# Patient Record
Sex: MALE | Employment: OTHER | URBAN - METROPOLITAN AREA
[De-identification: names, ages, dates, MRNs, and addresses within clinical notes are randomized per-mention and may not be internally consistent; named-entity substitution may affect disease eponyms.]

---

## 2022-12-03 ENCOUNTER — APPOINTMENT (OUTPATIENT)
Dept: CT IMAGING | Age: 82
DRG: 871 | End: 2022-12-03
Payer: MEDICARE

## 2022-12-03 ENCOUNTER — HOSPITAL ENCOUNTER (INPATIENT)
Age: 82
LOS: 1 days | Discharge: HOSPICE | DRG: 871 | End: 2022-12-05
Attending: EMERGENCY MEDICINE | Admitting: INTERNAL MEDICINE
Payer: MEDICARE

## 2022-12-03 ENCOUNTER — APPOINTMENT (OUTPATIENT)
Dept: GENERAL RADIOLOGY | Age: 82
DRG: 871 | End: 2022-12-03
Payer: MEDICARE

## 2022-12-03 DIAGNOSIS — R65.20 SEVERE SEPSIS (HCC): Primary | ICD-10-CM

## 2022-12-03 DIAGNOSIS — N17.9 ACUTE RENAL FAILURE, UNSPECIFIED ACUTE RENAL FAILURE TYPE (HCC): ICD-10-CM

## 2022-12-03 DIAGNOSIS — J18.9 PNEUMONIA OF RIGHT UPPER LOBE DUE TO INFECTIOUS ORGANISM: ICD-10-CM

## 2022-12-03 DIAGNOSIS — Z71.89 GOALS OF CARE, COUNSELING/DISCUSSION: ICD-10-CM

## 2022-12-03 DIAGNOSIS — A41.9 SEVERE SEPSIS (HCC): Primary | ICD-10-CM

## 2022-12-03 LAB
A/G RATIO: 1.2 (ref 1.1–2.2)
ALBUMIN SERPL-MCNC: 3.9 G/DL (ref 3.4–5)
ALP BLD-CCNC: 105 U/L (ref 40–129)
ALT SERPL-CCNC: 18 U/L (ref 10–40)
ANION GAP SERPL CALCULATED.3IONS-SCNC: 34 MMOL/L (ref 3–16)
AST SERPL-CCNC: 15 U/L (ref 15–37)
BASOPHILS ABSOLUTE: 0 K/UL (ref 0–0.2)
BASOPHILS RELATIVE PERCENT: 0.1 %
BILIRUB SERPL-MCNC: 0.4 MG/DL (ref 0–1)
BUN BLDV-MCNC: 186 MG/DL (ref 7–20)
CALCIUM SERPL-MCNC: 10.8 MG/DL (ref 8.3–10.6)
CHLORIDE BLD-SCNC: 107 MMOL/L (ref 99–110)
CO2: 11 MMOL/L (ref 21–32)
CREAT SERPL-MCNC: 10.5 MG/DL (ref 0.8–1.3)
EOSINOPHILS ABSOLUTE: 0 K/UL (ref 0–0.6)
EOSINOPHILS RELATIVE PERCENT: 0 %
GFR SERPL CREATININE-BSD FRML MDRD: 4 ML/MIN/{1.73_M2}
GLUCOSE BLD-MCNC: 151 MG/DL (ref 70–99)
HCT VFR BLD CALC: 54.4 % (ref 40.5–52.5)
HEMOGLOBIN: 17.6 G/DL (ref 13.5–17.5)
LACTIC ACID, SEPSIS: 6 MMOL/L (ref 0.4–1.9)
LIPASE: 18 U/L (ref 13–60)
LYMPHOCYTES ABSOLUTE: 1.5 K/UL (ref 1–5.1)
LYMPHOCYTES RELATIVE PERCENT: 6.9 %
MCH RBC QN AUTO: 26.7 PG (ref 26–34)
MCHC RBC AUTO-ENTMCNC: 32.3 G/DL (ref 31–36)
MCV RBC AUTO: 82.6 FL (ref 80–100)
MONOCYTES ABSOLUTE: 1.9 K/UL (ref 0–1.3)
MONOCYTES RELATIVE PERCENT: 8.3 %
NEUTROPHILS ABSOLUTE: 18.9 K/UL (ref 1.7–7.7)
NEUTROPHILS RELATIVE PERCENT: 84.7 %
PDW BLD-RTO: 16.8 % (ref 12.4–15.4)
PLATELET # BLD: 245 K/UL (ref 135–450)
PMV BLD AUTO: 8.6 FL (ref 5–10.5)
POTASSIUM SERPL-SCNC: 5.4 MMOL/L (ref 3.5–5.1)
RAPID INFLUENZA  B AGN: NEGATIVE
RAPID INFLUENZA A AGN: NEGATIVE
RBC # BLD: 6.59 M/UL (ref 4.2–5.9)
SARS-COV-2, NAAT: NOT DETECTED
SODIUM BLD-SCNC: 152 MMOL/L (ref 136–145)
TOTAL PROTEIN: 7.1 G/DL (ref 6.4–8.2)
TROPONIN: 0.14 NG/ML
WBC # BLD: 22.3 K/UL (ref 4–11)

## 2022-12-03 PROCEDURE — 87635 SARS-COV-2 COVID-19 AMP PRB: CPT

## 2022-12-03 PROCEDURE — 96367 TX/PROPH/DG ADDL SEQ IV INF: CPT

## 2022-12-03 PROCEDURE — 80053 COMPREHEN METABOLIC PANEL: CPT

## 2022-12-03 PROCEDURE — 99285 EMERGENCY DEPT VISIT HI MDM: CPT

## 2022-12-03 PROCEDURE — 87804 INFLUENZA ASSAY W/OPTIC: CPT

## 2022-12-03 PROCEDURE — 71045 X-RAY EXAM CHEST 1 VIEW: CPT

## 2022-12-03 PROCEDURE — 83690 ASSAY OF LIPASE: CPT

## 2022-12-03 PROCEDURE — 74176 CT ABD & PELVIS W/O CONTRAST: CPT

## 2022-12-03 PROCEDURE — 84484 ASSAY OF TROPONIN QUANT: CPT

## 2022-12-03 PROCEDURE — 36415 COLL VENOUS BLD VENIPUNCTURE: CPT

## 2022-12-03 PROCEDURE — 96365 THER/PROPH/DIAG IV INF INIT: CPT

## 2022-12-03 PROCEDURE — 85025 COMPLETE CBC W/AUTO DIFF WBC: CPT

## 2022-12-03 PROCEDURE — 83605 ASSAY OF LACTIC ACID: CPT

## 2022-12-03 PROCEDURE — 2580000003 HC RX 258: Performed by: NURSE PRACTITIONER

## 2022-12-03 RX ORDER — ACETAMINOPHEN 325 MG/1
650 TABLET ORAL EVERY 4 HOURS PRN
Status: ON HOLD | COMMUNITY
End: 2022-12-05 | Stop reason: HOSPADM

## 2022-12-03 RX ORDER — 0.9 % SODIUM CHLORIDE 0.9 %
1000 INTRAVENOUS SOLUTION INTRAVENOUS ONCE
Status: COMPLETED | OUTPATIENT
Start: 2022-12-03 | End: 2022-12-04

## 2022-12-03 RX ADMIN — SODIUM CHLORIDE 1000 ML: 9 INJECTION, SOLUTION INTRAVENOUS at 23:06

## 2022-12-03 ASSESSMENT — ENCOUNTER SYMPTOMS
ABDOMINAL PAIN: 0
NAUSEA: 0
VOMITING: 0
CHEST TIGHTNESS: 0
SHORTNESS OF BREATH: 0
DIARRHEA: 0

## 2022-12-04 PROBLEM — E11.9 DM2 (DIABETES MELLITUS, TYPE 2) (HCC): Status: ACTIVE | Noted: 2022-12-04

## 2022-12-04 PROBLEM — A41.9 SEPSIS (HCC): Status: ACTIVE | Noted: 2022-12-04

## 2022-12-04 PROBLEM — E87.0 HYPERNATREMIA: Status: ACTIVE | Noted: 2022-12-04
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
## 2022-12-04 PROBLEM — N17.9 ARF (ACUTE RENAL FAILURE) (HCC): Status: ACTIVE | Noted: 2022-12-04

## 2022-12-04 PROBLEM — J18.9 HOSPITAL-ACQUIRED PNEUMONIA: Status: ACTIVE | Noted: 2022-12-04

## 2022-12-04 LAB
ALBUMIN SERPL-MCNC: 2.8 G/DL (ref 3.4–5)
ALBUMIN SERPL-MCNC: 2.9 G/DL (ref 3.4–5)
ALBUMIN SERPL-MCNC: 3 G/DL (ref 3.4–5)
ANION GAP SERPL CALCULATED.3IONS-SCNC: 37 MMOL/L (ref 3–16)
ANION GAP SERPL CALCULATED.3IONS-SCNC: 37 MMOL/L (ref 3–16)
ANION GAP SERPL CALCULATED.3IONS-SCNC: 40 MMOL/L (ref 3–16)
BUN BLDV-MCNC: 198 MG/DL (ref 7–20)
BUN BLDV-MCNC: 202 MG/DL (ref 7–20)
BUN BLDV-MCNC: 205 MG/DL (ref 7–20)
CALCIUM SERPL-MCNC: 9.3 MG/DL (ref 8.3–10.6)
CALCIUM SERPL-MCNC: 9.6 MG/DL (ref 8.3–10.6)
CALCIUM SERPL-MCNC: 9.8 MG/DL (ref 8.3–10.6)
CHLORIDE BLD-SCNC: 107 MMOL/L (ref 99–110)
CHLORIDE URINE RANDOM: 22 MMOL/L
CO2: 4 MMOL/L (ref 21–32)
CO2: 4 MMOL/L (ref 21–32)
CO2: 7 MMOL/L (ref 21–32)
CREAT SERPL-MCNC: 10.7 MG/DL (ref 0.8–1.3)
CREAT SERPL-MCNC: 10.9 MG/DL (ref 0.8–1.3)
CREAT SERPL-MCNC: 11.5 MG/DL (ref 0.8–1.3)
CREATININE URINE: 104.3 MG/DL (ref 39–259)
GFR SERPL CREATININE-BSD FRML MDRD: 4 ML/MIN/{1.73_M2}
GLUCOSE BLD-MCNC: 183 MG/DL (ref 70–99)
GLUCOSE BLD-MCNC: 197 MG/DL (ref 70–99)
GLUCOSE BLD-MCNC: 208 MG/DL (ref 70–99)
GLUCOSE BLD-MCNC: 209 MG/DL (ref 70–99)
GLUCOSE BLD-MCNC: 215 MG/DL (ref 70–99)
GLUCOSE BLD-MCNC: 270 MG/DL (ref 70–99)
GLUCOSE BLD-MCNC: 273 MG/DL (ref 70–99)
GLUCOSE BLD-MCNC: 286 MG/DL (ref 70–99)
LACTIC ACID, SEPSIS: 7.7 MMOL/L (ref 0.4–1.9)
OSMOLALITY URINE: 379 MOSM/KG (ref 390–1070)
PERFORMED ON: ABNORMAL
PHOSPHORUS: 10.5 MG/DL (ref 2.5–4.9)
PHOSPHORUS: 11.6 MG/DL (ref 2.5–4.9)
PHOSPHORUS: 11.7 MG/DL (ref 2.5–4.9)
POTASSIUM SERPL-SCNC: 5.4 MMOL/L (ref 3.5–5.1)
POTASSIUM SERPL-SCNC: 5.7 MMOL/L (ref 3.5–5.1)
POTASSIUM SERPL-SCNC: 6.4 MMOL/L (ref 3.5–5.1)
POTASSIUM, UR: 43.1 MMOL/L
PROCALCITONIN: 1.44 NG/ML (ref 0–0.15)
REASON FOR REJECTION: NORMAL
REJECTED TEST: NORMAL
SODIUM BLD-SCNC: 148 MMOL/L (ref 136–145)
SODIUM BLD-SCNC: 151 MMOL/L (ref 136–145)
SODIUM BLD-SCNC: 151 MMOL/L (ref 136–145)
SODIUM URINE: <20 MMOL/L
UREA NITROGEN, UR: 428.7 MG/DL (ref 800–1666)
URIC ACID, SERUM: 14 MG/DL (ref 3.5–7.2)
URINE TYPE: NORMAL
VANCOMYCIN RANDOM: 10.4 UG/ML

## 2022-12-04 PROCEDURE — 6370000000 HC RX 637 (ALT 250 FOR IP): Performed by: INTERNAL MEDICINE

## 2022-12-04 PROCEDURE — 2500000003 HC RX 250 WO HCPCS: Performed by: INTERNAL MEDICINE

## 2022-12-04 PROCEDURE — 87449 NOS EACH ORGANISM AG IA: CPT

## 2022-12-04 PROCEDURE — 2580000003 HC RX 258: Performed by: INTERNAL MEDICINE

## 2022-12-04 PROCEDURE — 84300 ASSAY OF URINE SODIUM: CPT

## 2022-12-04 PROCEDURE — 82570 ASSAY OF URINE CREATININE: CPT

## 2022-12-04 PROCEDURE — 84145 PROCALCITONIN (PCT): CPT

## 2022-12-04 PROCEDURE — 82436 ASSAY OF URINE CHLORIDE: CPT

## 2022-12-04 PROCEDURE — 80202 ASSAY OF VANCOMYCIN: CPT

## 2022-12-04 PROCEDURE — 87205 SMEAR GRAM STAIN: CPT

## 2022-12-04 PROCEDURE — 83935 ASSAY OF URINE OSMOLALITY: CPT

## 2022-12-04 PROCEDURE — 94761 N-INVAS EAR/PLS OXIMETRY MLT: CPT

## 2022-12-04 PROCEDURE — 89220 SPUTUM SPECIMEN COLLECTION: CPT

## 2022-12-04 PROCEDURE — 36415 COLL VENOUS BLD VENIPUNCTURE: CPT

## 2022-12-04 PROCEDURE — 87040 BLOOD CULTURE FOR BACTERIA: CPT

## 2022-12-04 PROCEDURE — 84550 ASSAY OF BLOOD/URIC ACID: CPT

## 2022-12-04 PROCEDURE — 94640 AIRWAY INHALATION TREATMENT: CPT

## 2022-12-04 PROCEDURE — 1200000000 HC SEMI PRIVATE

## 2022-12-04 PROCEDURE — 2580000003 HC RX 258: Performed by: NURSE PRACTITIONER

## 2022-12-04 PROCEDURE — 83605 ASSAY OF LACTIC ACID: CPT

## 2022-12-04 PROCEDURE — 84540 ASSAY OF URINE/UREA-N: CPT

## 2022-12-04 PROCEDURE — 6360000002 HC RX W HCPCS: Performed by: NURSE PRACTITIONER

## 2022-12-04 PROCEDURE — 2700000000 HC OXYGEN THERAPY PER DAY

## 2022-12-04 PROCEDURE — 84133 ASSAY OF URINE POTASSIUM: CPT

## 2022-12-04 PROCEDURE — 94680 O2 UPTK RST&XERS DIR SIMPLE: CPT

## 2022-12-04 PROCEDURE — 80069 RENAL FUNCTION PANEL: CPT

## 2022-12-04 PROCEDURE — 6360000002 HC RX W HCPCS: Performed by: INTERNAL MEDICINE

## 2022-12-04 RX ORDER — INSULIN GLARGINE 100 [IU]/ML
46 INJECTION, SOLUTION SUBCUTANEOUS NIGHTLY
Status: ON HOLD | COMMUNITY
End: 2022-12-05 | Stop reason: HOSPADM

## 2022-12-04 RX ORDER — ROSUVASTATIN CALCIUM 40 MG/1
40 TABLET, COATED ORAL EVERY EVENING
Status: ON HOLD | COMMUNITY
End: 2022-12-05 | Stop reason: HOSPADM

## 2022-12-04 RX ORDER — INSULIN GLARGINE 100 [IU]/ML
46 INJECTION, SOLUTION SUBCUTANEOUS NIGHTLY
Status: DISCONTINUED | OUTPATIENT
Start: 2022-12-04 | End: 2022-12-05 | Stop reason: HOSPADM

## 2022-12-04 RX ORDER — TAMSULOSIN HYDROCHLORIDE 0.4 MG/1
0.4 CAPSULE ORAL DAILY
Status: ON HOLD | COMMUNITY
End: 2022-12-05 | Stop reason: HOSPADM

## 2022-12-04 RX ORDER — ENOXAPARIN SODIUM 100 MG/ML
40 INJECTION SUBCUTANEOUS DAILY
Status: DISCONTINUED | OUTPATIENT
Start: 2022-12-04 | End: 2022-12-04

## 2022-12-04 RX ORDER — IPRATROPIUM BROMIDE AND ALBUTEROL SULFATE 2.5; .5 MG/3ML; MG/3ML
1 SOLUTION RESPIRATORY (INHALATION) 4 TIMES DAILY
Status: DISCONTINUED | OUTPATIENT
Start: 2022-12-04 | End: 2022-12-05 | Stop reason: HOSPADM

## 2022-12-04 RX ORDER — DEXTROSE MONOHYDRATE 100 MG/ML
INJECTION, SOLUTION INTRAVENOUS CONTINUOUS PRN
Status: DISCONTINUED | OUTPATIENT
Start: 2022-12-04 | End: 2022-12-05 | Stop reason: HOSPADM

## 2022-12-04 RX ORDER — INSULIN LISPRO 100 [IU]/ML
1-10 INJECTION, SOLUTION INTRAVENOUS; SUBCUTANEOUS
Status: DISCONTINUED | OUTPATIENT
Start: 2022-12-04 | End: 2022-12-05 | Stop reason: HOSPADM

## 2022-12-04 RX ORDER — IPRATROPIUM BROMIDE AND ALBUTEROL SULFATE 2.5; .5 MG/3ML; MG/3ML
1 SOLUTION RESPIRATORY (INHALATION)
Status: DISCONTINUED | OUTPATIENT
Start: 2022-12-04 | End: 2022-12-04

## 2022-12-04 RX ORDER — ACETAMINOPHEN 325 MG/1
650 TABLET ORAL EVERY 4 HOURS PRN
Status: DISCONTINUED | OUTPATIENT
Start: 2022-12-04 | End: 2022-12-04 | Stop reason: SDUPTHER

## 2022-12-04 RX ORDER — 0.9 % SODIUM CHLORIDE 0.9 %
500 INTRAVENOUS SOLUTION INTRAVENOUS PRN
Status: DISCONTINUED | OUTPATIENT
Start: 2022-12-04 | End: 2022-12-05 | Stop reason: HOSPADM

## 2022-12-04 RX ORDER — POTASSIUM CHLORIDE 7.45 MG/ML
10 INJECTION INTRAVENOUS PRN
Status: DISCONTINUED | OUTPATIENT
Start: 2022-12-04 | End: 2022-12-04

## 2022-12-04 RX ORDER — 0.9 % SODIUM CHLORIDE 0.9 %
1000 INTRAVENOUS SOLUTION INTRAVENOUS ONCE
Status: COMPLETED | OUTPATIENT
Start: 2022-12-04 | End: 2022-12-04

## 2022-12-04 RX ORDER — SODIUM CHLORIDE 9 MG/ML
INJECTION, SOLUTION INTRAVENOUS CONTINUOUS
Status: DISCONTINUED | OUTPATIENT
Start: 2022-12-04 | End: 2022-12-04

## 2022-12-04 RX ORDER — ROSUVASTATIN CALCIUM 40 MG/1
40 TABLET, COATED ORAL EVERY EVENING
Status: DISCONTINUED | OUTPATIENT
Start: 2022-12-04 | End: 2022-12-05 | Stop reason: HOSPADM

## 2022-12-04 RX ORDER — SODIUM CHLORIDE 0.9 % (FLUSH) 0.9 %
5-40 SYRINGE (ML) INJECTION EVERY 12 HOURS SCHEDULED
Status: DISCONTINUED | OUTPATIENT
Start: 2022-12-04 | End: 2022-12-05 | Stop reason: HOSPADM

## 2022-12-04 RX ORDER — ONDANSETRON 4 MG/1
4 TABLET, ORALLY DISINTEGRATING ORAL EVERY 8 HOURS PRN
Status: DISCONTINUED | OUTPATIENT
Start: 2022-12-04 | End: 2022-12-05 | Stop reason: HOSPADM

## 2022-12-04 RX ORDER — ONDANSETRON 2 MG/ML
4 INJECTION INTRAMUSCULAR; INTRAVENOUS EVERY 6 HOURS PRN
Status: DISCONTINUED | OUTPATIENT
Start: 2022-12-04 | End: 2022-12-05 | Stop reason: HOSPADM

## 2022-12-04 RX ORDER — SODIUM CHLORIDE 9 MG/ML
INJECTION, SOLUTION INTRAVENOUS PRN
Status: DISCONTINUED | OUTPATIENT
Start: 2022-12-04 | End: 2022-12-05 | Stop reason: HOSPADM

## 2022-12-04 RX ORDER — ACETAMINOPHEN 650 MG/1
650 SUPPOSITORY RECTAL EVERY 6 HOURS PRN
Status: DISCONTINUED | OUTPATIENT
Start: 2022-12-04 | End: 2022-12-05 | Stop reason: HOSPADM

## 2022-12-04 RX ORDER — HEPARIN SODIUM 5000 [USP'U]/ML
5000 INJECTION, SOLUTION INTRAVENOUS; SUBCUTANEOUS EVERY 8 HOURS SCHEDULED
Status: DISCONTINUED | OUTPATIENT
Start: 2022-12-04 | End: 2022-12-05 | Stop reason: HOSPADM

## 2022-12-04 RX ORDER — POTASSIUM CHLORIDE 20 MEQ/1
40 TABLET, EXTENDED RELEASE ORAL PRN
Status: DISCONTINUED | OUTPATIENT
Start: 2022-12-04 | End: 2022-12-04

## 2022-12-04 RX ORDER — ACETAMINOPHEN 325 MG/1
650 TABLET ORAL EVERY 6 HOURS PRN
Status: DISCONTINUED | OUTPATIENT
Start: 2022-12-04 | End: 2022-12-05 | Stop reason: HOSPADM

## 2022-12-04 RX ORDER — HYDRALAZINE HYDROCHLORIDE 20 MG/ML
10 INJECTION INTRAMUSCULAR; INTRAVENOUS EVERY 6 HOURS PRN
Status: DISCONTINUED | OUTPATIENT
Start: 2022-12-04 | End: 2022-12-05 | Stop reason: HOSPADM

## 2022-12-04 RX ORDER — TAMSULOSIN HYDROCHLORIDE 0.4 MG/1
0.4 CAPSULE ORAL DAILY
Status: DISCONTINUED | OUTPATIENT
Start: 2022-12-04 | End: 2022-12-05 | Stop reason: HOSPADM

## 2022-12-04 RX ORDER — SODIUM CHLORIDE 0.9 % (FLUSH) 0.9 %
10 SYRINGE (ML) INJECTION PRN
Status: DISCONTINUED | OUTPATIENT
Start: 2022-12-04 | End: 2022-12-05 | Stop reason: HOSPADM

## 2022-12-04 RX ORDER — ALBUTEROL SULFATE 2.5 MG/3ML
2.5 SOLUTION RESPIRATORY (INHALATION) EVERY 4 HOURS PRN
Status: DISCONTINUED | OUTPATIENT
Start: 2022-12-04 | End: 2022-12-05 | Stop reason: HOSPADM

## 2022-12-04 RX ADMIN — INSULIN GLARGINE 46 UNITS: 100 INJECTION, SOLUTION SUBCUTANEOUS at 21:56

## 2022-12-04 RX ADMIN — INSULIN LISPRO 6 UNITS: 100 INJECTION, SOLUTION INTRAVENOUS; SUBCUTANEOUS at 12:51

## 2022-12-04 RX ADMIN — TAMSULOSIN HYDROCHLORIDE 0.4 MG: 0.4 CAPSULE ORAL at 09:11

## 2022-12-04 RX ADMIN — SODIUM CHLORIDE: 9 INJECTION, SOLUTION INTRAVENOUS at 03:28

## 2022-12-04 RX ADMIN — CEFEPIME 2000 MG: 2 INJECTION, POWDER, FOR SOLUTION INTRAVENOUS at 03:30

## 2022-12-04 RX ADMIN — IPRATROPIUM BROMIDE AND ALBUTEROL SULFATE 1 AMPULE: .5; 3 SOLUTION RESPIRATORY (INHALATION) at 08:35

## 2022-12-04 RX ADMIN — CEFEPIME HYDROCHLORIDE 2000 MG: 2 INJECTION, POWDER, FOR SOLUTION INTRAVENOUS at 00:30

## 2022-12-04 RX ADMIN — SODIUM CHLORIDE, PRESERVATIVE FREE 10 ML: 5 INJECTION INTRAVENOUS at 09:10

## 2022-12-04 RX ADMIN — ONDANSETRON 4 MG: 2 INJECTION INTRAMUSCULAR; INTRAVENOUS at 03:27

## 2022-12-04 RX ADMIN — VANCOMYCIN HYDROCHLORIDE 1000 MG: 1 INJECTION, POWDER, LYOPHILIZED, FOR SOLUTION INTRAVENOUS at 00:50

## 2022-12-04 RX ADMIN — SODIUM CHLORIDE 1000 ML: 9 INJECTION, SOLUTION INTRAVENOUS at 02:20

## 2022-12-04 RX ADMIN — IPRATROPIUM BROMIDE AND ALBUTEROL SULFATE 1 AMPULE: .5; 3 SOLUTION RESPIRATORY (INHALATION) at 11:57

## 2022-12-04 RX ADMIN — INSULIN LISPRO 4 UNITS: 100 INJECTION, SOLUTION INTRAVENOUS; SUBCUTANEOUS at 21:56

## 2022-12-04 RX ADMIN — SODIUM CHLORIDE, PRESERVATIVE FREE 10 ML: 5 INJECTION INTRAVENOUS at 21:58

## 2022-12-04 RX ADMIN — HEPARIN SODIUM 5000 UNITS: 5000 INJECTION INTRAVENOUS; SUBCUTANEOUS at 16:23

## 2022-12-04 RX ADMIN — IPRATROPIUM BROMIDE AND ALBUTEROL SULFATE 1 AMPULE: .5; 3 SOLUTION RESPIRATORY (INHALATION) at 20:17

## 2022-12-04 RX ADMIN — HEPARIN SODIUM 5000 UNITS: 5000 INJECTION INTRAVENOUS; SUBCUTANEOUS at 09:20

## 2022-12-04 RX ADMIN — SODIUM BICARBONATE: 84 INJECTION, SOLUTION INTRAVENOUS at 10:26

## 2022-12-04 RX ADMIN — IPRATROPIUM BROMIDE AND ALBUTEROL SULFATE 1 AMPULE: .5; 3 SOLUTION RESPIRATORY (INHALATION) at 15:37

## 2022-12-04 RX ADMIN — VANCOMYCIN HYDROCHLORIDE 1000 MG: 1 INJECTION, POWDER, LYOPHILIZED, FOR SOLUTION INTRAVENOUS at 09:10

## 2022-12-04 RX ADMIN — INSULIN LISPRO 4 UNITS: 100 INJECTION, SOLUTION INTRAVENOUS; SUBCUTANEOUS at 09:19

## 2022-12-04 ASSESSMENT — PAIN SCALES - WONG BAKER
WONGBAKER_NUMERICALRESPONSE: 0

## 2022-12-04 NOTE — PROGRESS NOTES
Progress Note - Dr. Johnny Mcdonald - Internal Medicine  PCP: Opal Linton [address] [place] [address] / [place] [address] Day: 0  Code Status: Full Code  Current Diet: ADULT DIET; Regular; 4 carb choices (60 gm/meal)        CC: follow up on medical issues    Subjective:   Yani Hampton is a 81 y.o. male. Pt seen and examined  Chart reviewed since last visit, labs and imaging below      No complaints  Resting comfortably      Review of Systems: (1 system for EPF, 2-9 for detailed, 10+ for comprehensive)  Pt not really answering questions. Cannot get ROS    I have reviewed the patient's medical and social history in detail and updated the computerized patient record. To recap: He  has a past medical history of Cataract cortical, senile, bilateral, Cognitive communication deficit, Dementia ([facility] Diabetes mellitus ([facility] and Hyperlipidemia. . He  has no past surgical history on file. Lee Ann Drake  .         Active Hospital Problems    Diagnosis Date Noted    Hospital-acquired pneumonia [J18.9, Y95] 12/04/2022     Priority: Medium    Sepsis ([facility] [A41.9] 12/04/2022     Priority: Medium    ARF (acute renal failure) ([facility] [N17.9] 12/04/2022     Priority: Medium    Hypernatremia [E87.0] 12/04/2022     Priority: Medium    DM2 (diabetes mellitus, type 2) ([facility] [E11.9] 12/04/2022     Priority: Medium       Current Facility-Administered Medications: insulin glargine (LANTUS) injection vial 46 Units, 46 Units, SubCUTAneous, Nightly  tamsulosin (FLOMAX) capsule 0.4 mg, 0.4 mg, Oral, Daily  rosuvastatin (CRESTOR) tablet 40 mg, 40 mg, Oral, QPM  insulin lispro (HUMALOG) injection vial 1-10 Units, 1-10 Units, SubCUTAneous, 4x Daily AC & HS  0.9 % sodium chloride infusion, , IntraVENous, Continuous  sodium chloride flush 0.9 % injection 5-40 mL, 5-40 mL, IntraVENous, 2 times per day  sodium chloride flush 0.9 % injection 10 mL, 10 mL, IntraVENous, PRN  0.9 % sodium chloride infusion, , IntraVENous, PRN  ondansetron (ZOFRAN-ODT) disintegrating tablet 4 mg, 4 mg, Oral, Q8H PRN **OR** ondansetron (ZOFRAN) injection 4 mg, 4 mg, IntraVENous, Q6H PRN  magnesium hydroxide (MILK OF MAGNESIA) 400 MG/5ML suspension 30 mL, 30 mL, Oral, Daily PRN  acetaminophen (TYLENOL) tablet 650 mg, 650 mg, Oral, Q6H PRN **OR** acetaminophen (TYLENOL) suppository 650 mg, 650 mg, Rectal, Q6H PRN  [START ON 12/5/2022] cefepime (MAXIPIME) 1000 mg IVPB minibag, 1,000 mg, IntraVENous, Q24H  vancomycin (VANCOCIN) intermittent dosing (placeholder), , Other, RX Placeholder  albuterol (PROVENTIL) nebulizer solution 2.5 mg, 2.5 mg, Nebulization, Q4H PRN  ipratropium-albuterol (DUONEB) nebulizer solution 1 ampule, 1 ampule, Inhalation, 4x daily  heparin (porcine) injection 5,000 Units, 5,000 Units, SubCUTAneous, 3 times per day  vancomycin 1000 mg IVPB in 250 mL D5W addavial, 15 mg/kg, IntraVENous, Once  glucose-vitamin C chewable tablet 4 tablet, 4 tablet, Oral, PRN  dextrose bolus 10% 125 mL, 125 mL, IntraVENous, PRN **OR** dextrose bolus 10% 250 mL, 250 mL, IntraVENous, PRN  glucagon (rDNA) injection 1 mg, 1 mg, SubCUTAneous, PRN  dextrose 10 % infusion, , IntraVENous, Continuous PRN  hydrALAZINE (APRESOLINE) injection 10 mg, 10 mg, IntraVENous, Q6H PRN  0.9 % sodium chloride bolus, 500 mL, IntraVENous, PRN         Objective:  BP (!) 138/59   Pulse 96   Temp 97.5 °F (36.4 °C) (Axillary)   Resp 20   Ht 5' 10\" (1.778 m) Comment: estimated  Wt 163 lb 1.6 oz (74 kg)   SpO2 96%   BMI 23.40 kg/m²      Patient Vitals for the past 24 hrs:   BP Temp Temp src Pulse Resp SpO2 Height Weight   12/04/22 0835 -- -- -- 96 20 96 % -- --   12/04/22 0429 -- -- -- -- -- -- 5' 10\" (1.778 m) 163 lb 1.6 oz (74 kg)   12/04/22 0401 -- -- -- -- -- 95 % -- --   12/04/22 0358 -- -- -- 94 20 97 % -- --   12/04/22 0308 (!) 138/59 97.5 °F (36.4 °C) Axillary -- 20 94 % -- --   12/04/22 0145 -- -- -- -- -- 100 % -- --   12/04/22 0130 121/81 -- -- -- -- 96 % -- --   12/04/22 0048 111/66 -- -- -- -- 94 % -- --   12/04/22 0030 122/81 -- -- -- -- -- -- --   12/04/22 0029 -- -- -- -- -- (!) 87 % -- --   12/04/22 0020 -- -- -- -- -- 91 % -- --   12/04/22 0000 -- -- -- -- -- (!) 86 % -- --   12/03/22 2352 -- -- -- -- -- -- -- 180 lb 4.8 oz (81.8 kg)   12/03/22 2315 137/72 -- -- 97 25 96 % -- --   12/03/22 2245 119/77 -- -- 99 27 95 % -- --   12/03/22 2230 132/82 -- -- 100 26 94 % -- --   12/03/22 2216 116/84 98.4 °F (36.9 °C) Oral 99 27 94 % -- --     Patient Vitals for the past 96 hrs (Last 3 readings):   Weight   12/04/22 0429 163 lb 1.6 oz (74 kg)   12/03/22 2352 180 lb 4.8 oz (81.8 kg)           Intake/Output Summary (Last 24 hours) at 12/4/2022 0062  Last data filed at 12/4/2022 0600  Gross per 24 hour   Intake 0 ml   Output 0 ml   Net 0 ml         Physical Exam: (2-7 system for EPF/Detailed, ?8 for Comprehensive)  BP (!) 138/59   Pulse 96   Temp 97.5 °F (36.4 °C) (Axillary)   Resp 20   Ht 5' 10\" (1.778 m) Comment: estimated  Wt 163 lb 1.6 oz (74 kg)   SpO2 96%   BMI 23.40 kg/m²   Constitutional: vitals as above: alert, appears stated age and cooperative    Head: Normocephalic, without obvious abnormality, atraumatic    Eyes:lids and lashes normal, conjunctivae and sclerae normal and pupils equal, round, reactive to light and accomodation    EMNT: external ears normal, nares midline    Neck: no carotid bruit, supple, symmetrical, trachea midline and thyroid not enlarged, symmetric, no tenderness/mass/nodules     Respiratory: crackles bilaterally with normal respiratory effort    Cardiovascular: normal rate, regular rhythm, normal S1 and S2 and no murmurs    Gastrointestinal: soft, non-tender, non-distended, normal bowel sounds, no masses or organomegaly    Extremities: no clubbing, no edema    Skin:No rashes or nodules noted.     Neurologic:negative         Labs:  Lab Results   Component Value Date    WBC 22.3 (H) 12/03/2022    HGB 17.6 (H) 12/03/2022    HCT 54.4 (H) 12/03/2022     12/03/2022 ALT 18 12/03/2022    AST 15 12/03/2022     (H) 12/03/2022    K 5.4 (H) 12/03/2022     12/03/2022    CREATININE 10.5 (HH) 12/03/2022     (HH) 12/03/2022    CO2 11 (LL) 12/03/2022     Lab Results   Component Value Date    TROPONINI 0.14 (H) 12/03/2022       Recent Imaging Results are Reviewed:  XR CHEST PORTABLE    Result Date: 12/3/2022  EXAMINATION: ONE XRAY VIEW OF THE CHEST 12/3/2022 10:56 pm COMPARISON: None. HISTORY: ORDERING SYSTEM PROVIDED HISTORY: SOB TECHNOLOGIST PROVIDED HISTORY: Reason for exam:->SOB Reason for Exam: SOB FINDINGS: Cardiac and mediastinal silhouettes appear within normal limits for size. Pulmonary vascularity is normal.  No pulmonary edema. Linear right perihilar airspace disease. No pneumothorax. No acute osseous abnormality. Focal right perihilar linear airspace disease which may represent atelectasis or pneumonia. Lungs are otherwise clear. CT CHEST ABDOMEN PELVIS WO CONTRAST Additional Contrast? None    Result Date: 12/4/2022  EXAMINATION: CT OF THE CHEST, ABDOMEN, AND PELVIS WITHOUT CONTRAST 12/3/2022 11:34 pm TECHNIQUE: CT of the chest, abdomen and pelvis was performed without the administration of intravenous contrast. Multiplanar reformatted images are provided for review. Automated exposure control, iterative reconstruction, and/or weight based adjustment of the mA/kV was utilized to reduce the radiation dose to as low as reasonably achievable.  COMPARISON: None HISTORY: ORDERING SYSTEM PROVIDED HISTORY: mass right side of abdomen TECHNOLOGIST PROVIDED HISTORY: Reason for exam:->mass right side of abdomen Additional Contrast?->None Decision Support Exception - unselect if not a suspected or confirmed emergency medical condition->Emergency Medical Condition (MA) Reason for Exam: Failure To Thrive (Pt via EMS from [facility], started aricept 4 days ago for alzheimers, pt usually alert and talking, now pt having problems speaking, not eating or drinking, obvious swelling to right side abdomen, rash noted to both arms, hx of diabetes, bs 133 in squad) FINDINGS: CT CHEST: Mediastinum no enlarged lymphadenopathy. Aorta is unremarkable. Heart is normal. no effusion. Lungs/Pleura consolidation in the right upper lobe. This is along the major fissure. Mild respiratory motion. Atelectasis in the right lower lobe. Trachea and bronchi are patent. Bones spondylosis. No lytic destructive lesions. Right upper lobe pneumonia. Follow-up radiographs to resolution are recommended. CT ABDOMEN AND PELVIS Organs Liver, spleen, adrenals are unremarkable in appearance. No hydronephrosis. Large cyst on the left kidney. This has the Hounsfield units of 18 in it measures 4 cm transverse by 4.1 cm anterior to posterior. GI No small bowel dilation. Co colonic wall thickening. Stomach is unremarkable. No evidence for bowel obstruction. The cecum is a mildly thickened wall but this is not an annular constricting lesion. No small bowel dilation. Scattered colonic diverticula but no adjacent fatty stranding. Aorta No aneurysm. Atherosclerotic changes. Peritoneum/retroperitoneum No free fluid. No free gas. No enlarged lymphadenopathy. Other the prostate is enlarged and has multiple calcifications. It measures approximately 6.7 cm transverse by 4.8 cm anterior to posterior by 7.4 cm craniocaudal.  Multiple phleboliths within the pelvis. The bladder is not have a thickened wall. Bones large anterior spurs at multiple levels. Loss of lumbar lordosis. No lytic destructive lesions. Facet arthropathy at multiple levels. Mild osteoarthritic changes at the SI joints. IMPRESSION: 1. Mild thickening of the cecum but no definite mass. This is not well evaluated without contrast. 2. Prostate is markedly enlarged.        Lab Results   Component Value Date/Time    GLUCOSE 151 12/03/2022 10:50 PM     Lab Results   Component Value Date/Time    POCGLU 183 12/04/2022 03:09 AM BP (!) 138/59   Pulse 96   Temp 97.5 °F (36.4 °C) (Axillary)   Resp 20   Ht 5' 10\" (1.778 m) Comment: estimated  Wt 163 lb 1.6 oz (74 kg)   SpO2 96%   BMI 23.40 kg/m²     Assessment and Plan:  Principal Problem:    Hospital-acquired pneumonia -Established problem. Stable. Plan: cont abx. Cont to trend wbc procal  Active Problems:    Sepsis ([address] -Established problem. Stable. Plan: trend lactate    ARF (acute renal failure) ([facility] -Established problem. Stable. Creat 10.5  Plan: renal consulted. Hypernatremia -Established problem. Stable.   Na 152  Plan: renal to see    DM2 (diabetes mellitus, type 2) ([facility])  Plan: cont ccc diet, sliding scale, home meds      (Please note that portions of this note were completed with a voice recognition program.  Efforts were made to edit the dictations but occasionally words are mis-transcribed.)        Angelica Godoy MD  12/4/2022

## 2022-12-04 NOTE — PROGRESS NOTES
4 Eyes Admission Assessment     I agree as the admission nurse that 2 RN's have performed a thorough Head to Toe Skin Assessment on the patient. ALL assessment sites listed below have been assessed on admission. Areas assessed by both nurses:   [x]   Head, Face, and Ears   [x]   Shoulders, Back, and Chest  [x]   Arms, Elbows, and Hands   [x]   Coccyx, Sacrum, and Ischium  [x]   Legs, Feet, and Heels        Does the Patient have Skin Breakdown? Yes a wound was noted on the Admission Assessment and an LDA was Initiated documentation include the Rajni-wound, Wound Assessment, Measurements, Dressing Treatment, Drainage, and Color\", Red rash with hives noted to bilateral upper arms. Possible DTI to buttocks noted with surrounding blanchable redness noted upon admission.          Alan Prevention initiated:  Yes   Wound Care Orders initiated:  No      [facility] nurse consulted for Pressure Injury (Stage 3,4, Unstageable, DTI, NWPT, and Complex wounds) or Alan score 18 or lower:  Yes      Nurse 1 eSignature: Electronically signed by Cole Reyes RN on 12/4/22 at 4:32 AM EST    **SHARE this note so that the co-signing nurse is able to place an eSignature**    Nurse 2 eSignature: {Esignature:[id number]}

## 2022-12-04 NOTE — PROGRESS NOTES
12/04/22 1309   Resting (Room Air)   SpO2 85   HR 74   Resting (On O2)   SpO2 92   HR 74   O2 Device Nasal cannula   O2 Flow Rate (l/min) 2 l/min   After Walk   SpO2 92   HR 74   O2 Device Nasal cannula   O2 Flow Rate (l/min) 2 l/min   Rate of Dyspnea 1   Does the Patient Qualify for Home O2 Yes   Liter Flow at Rest 2   Liter Flow on Exertion   (See comment below)     Patient is unable to follow commands and is bedridden. Cannot perform walk test. MD notified.     Electronically signed by Dorina Hardy RCP on 12/4/2022 at 1:10 PM

## 2022-12-04 NOTE — SIGNIFICANT EVENT
MD Kenan Vail MD Dianah Shiley, MD                  Office: [phone]                 Fax: [phone]         [address] [place] [address]                            NEPHROLOGY UPDATE NOTE:     PATIENT NAME: Chivo Salinas  : 1940  MRN: [id number]      Labs were ordered stat about 3 hours ago, but still not done. So I requested nurse again to arrange it urgently and to page me once it is resulted to review. I also requested nurse to insert Thomas, who reported after Thomas insertion, some urine output only but cloudy urine. Requested nurse to get urine sample,. As per orders. Thank you for allowing me to participate in this patient's care. Please do not hesitate to contact me for any questions/concerns. We will follow along with you.      Joellen Hamilton MD  [facility] [address] [place] [address]   Phone: [phone] or Via [employer]  Fax: [phone]

## 2022-12-04 NOTE — RT PROTOCOL NOTE
RT Inhaler-Nebulizer Bronchodilator Protocol Note    There is a bronchodilator order in the chart from a provider indicating to follow the RT Bronchodilator Protocol and there is an Initiate RT Inhaler-Nebulizer Bronchodilator Protocol order as well (see protocol at bottom of note). CXR Findings:  XR CHEST PORTABLE    Result Date: 12/3/2022  Focal right perihilar linear airspace disease which may represent atelectasis or pneumonia. Lungs are otherwise clear. The findings from the last RT Protocol Assessment were as follows:   History Pulmonary Disease: None or smoker <15 pack years  Respiratory Pattern: Mild dyspnea at rest, irregular pattern, or RR 21-25 bpm  Breath Sounds: Inspiratory and expiratory or bilateral wheezing and/or rhonchi  Cough: Weak, productive  Indication for Bronchodilator Therapy: Decreased or absent breath sounds  Bronchodilator Assessment Score: 12    Aerosolized bronchodilator medication orders have been revised according to the RT Inhaler-Nebulizer Bronchodilator Protocol below. Respiratory Therapist to perform RT Therapy Protocol Assessment initially then follow the protocol. Repeat RT Therapy Protocol Assessment PRN for score 0-3 or on second treatment, BID, and PRN for scores above 3. No Indications - adjust the frequency to every 6 hours PRN wheezing or bronchospasm, if no treatments needed after 48 hours then discontinue using Per Protocol order mode. If indication present, adjust the RT bronchodilator orders based on the Bronchodilator Assessment Score as indicated below. Use Inhaler orders unless patient has one or more of the following: on home nebulizer, not able to hold breath for 10 seconds, is not alert and oriented, cannot activate and use MDI correctly, or respiratory rate 25 breaths per minute or more, then use the equivalent nebulizer order(s) with same Frequency and PRN reasons based on the score.   If a patient is on this medication at home then do not decrease Frequency below that used at home. 0-3 - enter or revise RT bronchodilator order(s) to equivalent RT Bronchodilator order with Frequency of every 4 hours PRN for wheezing or increased work of breathing using Per Protocol order mode. 4-6 - enter or revise RT Bronchodilator order(s) to two equivalent RT bronchodilator orders with one order with BID Frequency and one order with Frequency of every 4 hours PRN wheezing or increased work of breathing using Per Protocol order mode. 7-10 - enter or revise RT Bronchodilator order(s) to two equivalent RT bronchodilator orders with one order with TID Frequency and one order with Frequency of every 4 hours PRN wheezing or increased work of breathing using Per Protocol order mode. 11-13 - enter or revise RT Bronchodilator order(s) to one equivalent RT bronchodilator order with QID Frequency and an Albuterol order with Frequency of every 4 hours PRN wheezing or increased work of breathing using Per Protocol order mode. Greater than 13 - enter or revise RT Bronchodilator order(s) to one equivalent RT bronchodilator order with every 4 hours Frequency and an Albuterol order with Frequency of every 2 hours PRN wheezing or increased work of breathing using Per Protocol order mode. RT to enter RT Home Evaluation for COPD & MDI Assessment order using Per Protocol order mode.     Electronically signed by Orion Gonzalez RCP on 12/4/2022 at 5:15 AM

## 2022-12-04 NOTE — PROGRESS NOTES
Clinical Pharmacy Note: Pharmacy to Dose Vancomycin    Floyd Daly is a 81 y.o. male started on Vancomycin for HAP; consult received from Dr. Sapphire Tomlin to manage therapy. Also receiving the following antibiotics: Cefepime. Goal AUC: 400-600 mg/L*hr  Goal Trough Level: 15-20 mcg/mL    Assessment/Plan:  A 1000 mg loading dose was given on 12/04 at 0050  Will intermittently dose due to renal function  A vancomycin random level has been ordered for 12/04 at 0600  Changes in regimen will be determined based on culture results, renal function, and clinical response. Pharmacy will continue to monitor and adjust regimen as necessary. Allergies:  Aspirin     Recent Labs     12/03/22  2250   CREATININE 10.5*       Recent Labs     12/03/22  2250   WBC 22.3*       Ht Readings from Last 1 Encounters:   12/04/22 5' 10\" (1.778 m)        Wt Readings from Last 1 Encounters:   12/04/22 163 lb 1.6 oz (74 kg)         Estimated Creatinine Clearance: 6 mL/min (A) (based on SCr of 10.5 mg/dL [facility])).       Thank you for the consult,    Brendon Castellano, SherieD

## 2022-12-04 NOTE — H&P
History and Physical  Dr. Minh Ch  12/4/2022    PCP: Roxane Mccormick    Cc:   Chief Complaint   Patient presents with    Failure To Thrive     Pt via EMS from [facility], started aricept 4 days ago for alzheimers, pt usually alert and talking, now pt having problems speaking, not eating or drinking, obvious swelling to right side abdomen, rash noted to both arms, hx of diabetes, bs 133 in squad       HPI:  Ida Halsted is a 81 y.o. male who has a past medical history of Cataract cortical, senile, bilateral, Cognitive communication deficit, Dementia ([address] Diabetes mellitus ([address] and Hyperlipidemia. Patient presents with Hospital-acquired pneumonia. HPI  (1-3 for expanded problem focused, ?4 for detailed/comprehensive)     81 y.o. male who presents to the emergency department with complaint of not eating or drinking. The patient is from [facility] nursing home, report states that this patient was started on Aricept 4 days ago for treatment of dementia but medication was discontinued yesterday, they report that the patient is generally alert and jovial, visiting with others however now he is \"having problems speaking\" also not eating or drinking. Imaging from ER reviewed by self  CT CHEST ABDOMEN PELVIS WO CONTRAST Additional Contrast? None (Final result)  Result time 12/04/22 01:13:10      Final result by Jasper Hughes MD (12/04/22 01:13:10)                      Impression:       Right upper lobe pneumonia. Follow-up radiographs to resolution are   recommended. Per ER NP Note: \"I did speak with the Theresa LOPEZ twice, once initially to gather information as well as to confirm DNR CC status and then again at 0149 to update regarding plan of care with admission and treatment for right upper lobe pneumonia. \"      CBC does show white blood cell count of 22.3 with a hemoglobin of 17.6.   CMP concerning for kidney failure/dehydration with a sodium of 152 potassium 5.4 bicarb of 11  and creatinine of 10.5. Patient GFR tonight is 4. Lactate is 6.0, repeat lactate is pending. Lipase 18. Troponin elevated 0.14, COVID is negative    To be admitted for tx of pna, renal to see for CONSTANTIN  Old chart reviewed; not much available in computer  Unclear if pt has required HD in past or not    Problem list of hospitalization thus far: Active Hospital Problems    Diagnosis     Hospital-acquired pneumonia [J18.9, Y95]      Priority: Medium    Sepsis ([address] [A41.9]      Priority: Medium    ARF (acute renal failure) (HCC) [N17.9]      Priority: Medium    Hypernatremia [E87.0]      Priority: Medium    DM2 (diabetes mellitus, type 2) ([address] [E11.9]      Priority: Medium         Review of Systems: (1 system for EPF, 2-9 for detailed, 10+ for comprehensive)  Cannot obtain from pt 2/2 mentation change    Past Medical History:   Past Medical History:   Diagnosis Date    Cataract cortical, senile, bilateral     Cognitive communication deficit     Dementia ([address]     Diabetes mellitus ([address]     type II    Hyperlipidemia        Past Surgical History: No past surgical history on file. Social History:   Social History       Tobacco History       Smoking Status  Unknown      Smokeless Tobacco Use  Unknown              Alcohol History       Alcohol Use Status  Not Asked              Drug Use       Drug Use Status  Not Asked              Sexual Activity       Sexually Active  Not Asked                    Fam History: No family history on file. PFSH: The above PMHx, PSHx, SocHx, FamHx has been reviewed by myself. (1 area for detailed, 2-3 for comprehensive)      Code Status: Full Code    [facility]  Prior to Admission medications    Medication Sig Start Date End Date Taking?  Authorizing Provider   insulin glargine (LANTUS) 100 UNIT/ML injection vial Inject 46 Units into the skin nightly   Yes Historical Provider, MD   tamsulosin (FLOMAX) 0.4 MG capsule Take 0.4 mg by mouth daily   Yes Historical Provider, MD   metFORMIN (GLUCOPHAGE) 1000 MG tablet Take 1,000 mg by mouth 2 times daily (with meals)   Yes Historical Provider, MD   rosuvastatin (CRESTOR) 40 MG tablet Take 40 mg by mouth every evening   Yes Historical Provider, MD   insulin aspart (NOVOLOG) 100 UNIT/ML injection cartridge Inject into the skin 3 times daily (before meals) Sliding scale  BG 0-149= 0 units  -200= 2 units  -250= 4 units  -300= 6 units  -350= 8 units  -400= 10 units  Anything over 400, call MD for orders   Yes Historical Provider, MD   acetaminophen (TYLENOL) 325 MG tablet Take 650 mg by mouth every 4 hours as needed for Pain   Yes Historical Provider, MD         Allergies   Allergen Reactions    Aspirin              EXAM: (2-7 system for EPF/Detailed, ?8 for Comprehensive)  BP (!) 104/57   Pulse 94   Temp 96.8 °F (36 °C) (Axillary)   Resp 30   Ht 5' 10\" (1.778 m) Comment: estimated  Wt 163 lb 1.6 oz (74 kg)   SpO2 95%   BMI 23.40 kg/m²   Constitutional: vitals as above: alert, appears stated age and cooperative    Head: Normocephalic, without obvious abnormality, atraumatic    Eyes:lids and lashes normal, conjunctivae and sclerae normal and pupils equal, round, reactive to light and accomodation    EMNT: external ears normal, nares midline    Neck: no carotid bruit, supple, symmetrical, trachea midline and thyroid not enlarged, symmetric, no tenderness/mass/nodules     Respiratory: crackles bilaterally with normal respiratory effort    Cardiovascular: normal rate, regular rhythm, normal S1 and S2 and no murmurs    Gastrointestinal: soft, non-tender, non-distended, normal bowel sounds, no masses or organomegaly    Extremities: no clubbing, no edema    Skin:No rashes or nodules noted.     Neurologic:negative         LABS:  Labs Reviewed   CBC WITH AUTO DIFFERENTIAL - Abnormal; Notable for the following components:       Result Value    WBC 22.3 (*)     RBC 6.59 (*)     Hemoglobin 17.6 (*)     Hematocrit 54.4 (*)     RDW 16.8 (*)     Neutrophils Absolute 18.9 (*)     Monocytes Absolute 1.9 (*)     All other components within normal limits   COMPREHENSIVE METABOLIC PANEL - Abnormal; Notable for the following components:    Sodium 152 (*)     Potassium 5.4 (*)     CO2 11 (*)     Anion Gap 34 (*)     Glucose 151 (*)      (*)     Creatinine 10.5 (*)     Est, Glom Filt Rate 4 (*)     Calcium 10.8 (*)     All other components within normal limits    Narrative:     CALL  [place]  [facility] tel. [phone],  Chemistry results called to and read back by Chanel Gupta, 12/03/2022 23:28,  by [facility]  Chemistry results called to and read back by Chanel Gupta, 12/03/2022 23:28,  by [address] [place] [address], SEPSIS - Abnormal; Notable for the following components:    Lactic Acid, Sepsis 6.0 (*)     All other components within normal limits    Narrative:     Yoselyn Giang  [facility] tel. [phone],  Chemistry results called to and read back by Lisa Carrillo, 12/03/2022 23:16,  by [facility]   TROPONIN - Abnormal; Notable for the following components:    Troponin 0.14 (*)     All other components within normal limits    Narrative:     Yoselyn Giang  [facility] tel. [phone],  Chemistry results called to and read back by Chanel Gupta, 12/03/2022 23:28,  by [address] [place] [address], SEPSIS - Abnormal; Notable for the following components:    Lactic Acid, Sepsis 7.7 (*)     All other components within normal limits    Narrative:     Yoselyn Giang  [facility] tel. [phone],  Chemistry results called to and read back by Barbie Liu, 12/04/2022 07:06,  by Jorgito Palma   PROCALCITONIN - Abnormal; Notable for the following components:    Procalcitonin 1.44 (*)     All other components within normal limits   POCT GLUCOSE - Abnormal; Notable for the following components:    POC Glucose 183 (*)     All other components within normal limits   POCT GLUCOSE - Abnormal; Notable for the following components:    POC Glucose 209 (*)     All other components within normal limits   COVID-19, RAPID   RAPID INFLUENZA A/B ANTIGENS   LEGIONELLA ANTIGEN, URINE   CULTURE, RESPIRATORY   LIPASE    Narrative:     Sarai PIERREERROSELYN tel. [phone],  Chemistry results called to and read back by Chanel Gupta, 12/03/2022 23:28,  by [facility], RANDOM   SPECIMEN REJECTION   LACTATE, SEPSIS   RENAL FUNCTION PANEL   CREATININE, RANDOM URINE   ELECTROLYTES URINE RANDOM   URIC ACID   UREA NITROGEN, URINE   OSMOLALITY, URINE   POCT GLUCOSE   POCT GLUCOSE   POCT GLUCOSE         IMAGING:  Imaging results from the ER have been reviewed in the computerized chart. XR CHEST PORTABLE    Result Date: 12/3/2022  EXAMINATION: ONE XRAY VIEW OF THE CHEST 12/3/2022 10:56 pm COMPARISON: None. HISTORY: ORDERING SYSTEM PROVIDED HISTORY: SOB TECHNOLOGIST PROVIDED HISTORY: Reason for exam:->SOB Reason for Exam: SOB FINDINGS: Cardiac and mediastinal silhouettes appear within normal limits for size. Pulmonary vascularity is normal.  No pulmonary edema. Linear right perihilar airspace disease. No pneumothorax. No acute osseous abnormality. Focal right perihilar linear airspace disease which may represent atelectasis or pneumonia. Lungs are otherwise clear. CT CHEST ABDOMEN PELVIS WO CONTRAST Additional Contrast? None    Result Date: 12/4/2022  EXAMINATION: CT OF THE CHEST, ABDOMEN, AND PELVIS WITHOUT CONTRAST 12/3/2022 11:34 pm TECHNIQUE: CT of the chest, abdomen and pelvis was performed without the administration of intravenous contrast. Multiplanar reformatted images are provided for review. Automated exposure control, iterative reconstruction, and/or weight based adjustment of the mA/kV was utilized to reduce the radiation dose to as low as reasonably achievable.  COMPARISON: None HISTORY: ORDERING SYSTEM PROVIDED HISTORY: mass right side of abdomen TECHNOLOGIST PROVIDED HISTORY: Reason for exam:->mass right side of abdomen Additional Contrast?->None Decision Support Exception - unselect if not a suspected or confirmed emergency medical condition->Emergency Medical Condition (MA) Reason for Exam: Failure To Thrive (Pt via EMS from [facility], started aricept 4 days ago for alzheimers, pt usually alert and talking, now pt having problems speaking, not eating or drinking, obvious swelling to right side abdomen, rash noted to both arms, hx of diabetes, bs 133 in squad) FINDINGS: CT CHEST: Mediastinum no enlarged lymphadenopathy. Aorta is unremarkable. Heart is normal. no effusion. Lungs/Pleura consolidation in the right upper lobe. This is along the major fissure. Mild respiratory motion. Atelectasis in the right lower lobe. Trachea and bronchi are patent. Bones spondylosis. No lytic destructive lesions. Right upper lobe pneumonia. Follow-up radiographs to resolution are recommended. CT ABDOMEN AND PELVIS Organs Liver, spleen, adrenals are unremarkable in appearance. No hydronephrosis. Large cyst on the left kidney. This has the Hounsfield units of 18 in it measures 4 cm transverse by 4.1 cm anterior to posterior. GI No small bowel dilation. Co colonic wall thickening. Stomach is unremarkable. No evidence for bowel obstruction. The cecum is a mildly thickened wall but this is not an annular constricting lesion. No small bowel dilation. Scattered colonic diverticula but no adjacent fatty stranding. Aorta No aneurysm. Atherosclerotic changes. Peritoneum/retroperitoneum No free fluid. No free gas. No enlarged lymphadenopathy. Other the prostate is enlarged and has multiple calcifications. It measures approximately 6.7 cm transverse by 4.8 cm anterior to posterior by 7.4 cm craniocaudal.  Multiple phleboliths within the pelvis. The bladder is not have a thickened wall. Bones large anterior spurs at multiple levels. Loss of lumbar lordosis. No lytic destructive lesions. Facet arthropathy at multiple levels. Mild osteoarthritic changes at the SI joints. IMPRESSION: 1. Mild thickening of the cecum but no definite mass. This is not well evaluated without contrast. 2. Prostate is markedly enlarged. EKG:     Lab Results   Component Value Date/Time    GLUCOSE 151 12/03/2022 10:50 PM     Lab Results   Component Value Date/Time    POCGLU 209 12/04/2022 09:08 AM     BP (!) 104/57   Pulse 94   Temp 96.8 °F (36 °C) (Axillary)   Resp 30   Ht 5' 10\" (1.778 m) Comment: estimated  Wt 163 lb 1.6 oz (74 kg)   SpO2 95%   BMI 23.40 kg/m²     MEDICAL DECISION MAKING:    Principal Problem:    Hospital-acquired pneumonia -New Problem to me. Plan: iv abx started - empiric HCAP abx. DuoNeb HHN ordered. O2 as needed. Will check serial CXR. WBC ordered for AM.  Respiratory therapy asked to see. Monitor for signs of antibiotic toxicity with serial exam and lab studies   Active Problems:    Sepsis ([address] -New Problem to me. Plan: ivf, iv abx    ARF (acute renal failure) ([address] -Established problem. Uncontrolled. Creat >10  Plan: consult renal; pt may require HD    Hypernatremia -New Problem to me. Na > 150  Plan: ivf, consult renal    DM2 (diabetes mellitus, type 2) ([facility] [address] [facility]  Plan: Patient placed on controlled carbohydrate diet. Fingerstick sugars to be checked to monitor for both hypoglycemia as well as hyperglycemia. Sliding scale insulin ordered. Glucagon and dextrose ordered for hypoglycemia. Patient will be continued on home medications. Hemoglobin a1c to be ordered to assess efficacy of therapy. Diagnoses as listed above, designated as new or established and plan outlined for each. Data Reviewed:   (1) Lab tests were reviewed or ordered. (1) Radiology tests were reviewed or ordered. (1) Medical test (Echo, EKG, PFT/adarsh) were ordered. (1)History was not obtained from someone other than patient  (1) Old records were reviewed - see HPI/MDM for pertinent details if review done.   (2) Case was discussed with another health care provider: Eve MEANS NP  (2) Imaging was viewed by myself. (2) EKG  was viewed by myself. The patient is being placed in inpatient status with the expectation of requiring a hospital stay spanning at least two midnights for care and treatment of the problems noted in the problem list.  This determination is also based on thepatients comorbidities and past medical history, the severity and timing of the signs and symptoms upon presentation.     (Please note that portions of this note were completed with a voice recognition program.  Efforts were made to edit the dictations but occasionally words are mis-transcribed.)      Electronically signed by: Ishan Villanueva MD 12/4/2022

## 2022-12-04 NOTE — CONSULTS
MD Oz Aguirre MD Margery Curia, MD                  Office: [phone]                      Fax: [phone] [facility]                   NEPHROLOGY INITIAL CONSULT NOTE:     PATIENT NAME: Sunita Costello  : 1940  MRN: [id number]  REASON FOR CONSULT: For evaluation and management of Acute Kidney Injury . (My recommendations will be communicated by way of shared medical record.)      RECOMMENDATIONS:   -Change NS to bicarb with D5  -With 150 M EQ's at 125 cc/h (no h/o HF, monitor for fluid overload)     -Na goal ~145 over 24 hrs  -Follow-up recheck labs  -Empirical antibiotics, follow-up culture   -- IV antibiotic: Vancomycin: trough goal <15, pharmacy team assisting. -ISS for BG control  -trend LA  -Insert Thomas catheter as with markedly enlarged prostate on CT scan  - check UA w/ microscopy, urine lytes,  - no need for dialysis,  at higher risk for decompensation, needing closer monitoring. D/C plan from renal stand point:  -Expect prolonged recovery    Discussed with patient's team nurse    IMPRESSION:       Admitted on:  12/3/2022 10:09 PM   For:  Hospital-acquired pneumonia [J18.9, Y95]  Goals of care, counseling/discussion [Z71.89]  Severe sepsis ([address] [A41.9, R65.20]  Acute renal failure, unspecified acute renal failure type ([address] [N17.9]  Pneumonia of right upper lobe due to infectious organism [J18.9]      CONSTANTIN (unknown baseline renal function. )  - BL Scr-unknown->  10.5 on admission  - Etiology of CONSTANTIN -presumed ATN in the setting of sepsis, severe hypovolemia, dehydration. - other differentials: If no major improvement, will GN / TI / TMA process  - UA - needs it   - Renal imaging: on admission with CAT scan:  Prostate is markedly enlarged. + no hydronephrosis. Large cyst on the left kidney.         Associated problems:   - Volume status: hypovolemic  : HTN : no need for tight control   : Na: Hypernatremia, 152 on admission,  -Due to dehydration, increase free water deficit  -With acute encephalopathy, likely metabolic    - Azotemia: Severe, 186 on admission some component of uremic encephalopathy  - Electrolytes: K: Hyperkalemia, 5.4,  : Hypercalcemia, mild, likely due to hypovolemia, follow-up course, treatment initiate work-up if no improvement  - Acid-Base: Acidosis, severe, bicarb 11, high anion gap, metabolic versus lactic acidosis, history of  - Anemia: Elevated hemoglobin, likely due to hemoconcentration      Other major problems: Management per primary and other consulting teams. Hospital Problems             Last Modified POA    * (Principal) Hospital-acquired pneumonia 12/4/2022 Yes    Sepsis ([facility] 12/4/2022 Yes    ARF (acute renal failure) ([facility] [address] 12/4/2022 Yes    Hypernatremia 12/4/2022 Yes    DM2 (diabetes mellitus, type 2) ([facility] [address] 12/4/2022 Yes         : other supportive care :   - Check daily renal function panel with electrolytes-phosphorus  - Strict monitoring of I/Os, daily weight  - Renal feeds/diet  - Current medications reviewed. - Nephrotoxic medications have been discontinued. - Dose adjusted and appropriate. - Dose meds for eGFR <15 mL/min/1.73m2 during CONSTANTIN    - Avoid heavy opioids due to renal failure - may use very low dose dilaudid / fentanyl with close monitoring of CNS and respiratory depression. Please refer to the orders. High Complexity. Multiple complex problems. Discussed with patient;s treatment team-  nurse  Time spent > 30 (~35) minutes. Thank you for allowing me to participate in this patient's care. Please do not hesitate to contact me anytime. We will follow along with you.        Anabelle Posadas MD,  [facility] [address] [place]: [phone] or [address] [employer]  Fax: [phone]        ======================================================================================= =======================================================================================      CHIEF COMPLAINT:   Chief Complaint   Patient presents with    Failure To Thrive     Pt via EMS from [facility], started aricept 4 days ago for alzheimers, pt usually alert and talking, now pt having problems speaking, not eating or drinking, obvious swelling to right side abdomen, rash noted to both arms, hx of diabetes, bs 133 in squad     History Obtained From:  reason patient could not give history:  altered mental status + treatment team + Electronic Medical Records    HPI: Mr. Britt Abarca is a 81 y.o. male with significant past medical history as below:   Past Medical History:   Diagnosis Date    Cataract cortical, senile, bilateral     Cognitive communication deficit     Dementia ([facility] [address]     Diabetes mellitus ([facility] [address]     type II    Hyperlipidemia       Presents with Failure To Thrive (Pt via EMS from [facility], started aricept 4 days ago for alzheimers, pt usually alert and talking, now pt having problems speaking, not eating or drinking, obvious swelling to right side abdomen, rash noted to both arms, hx of diabetes, bs 133 in squad)    Admitted with Hospital-acquired pneumonia [J18.9, Y95]  Goals of care, counseling/discussion [Z71.89]  Severe sepsis ([facility] [address] [A41.9, R65.20]  Acute renal failure, unspecified acute renal failure type ([facility] [address] [N17.9]  Pneumonia of right upper lobe due to infectious organism [J18.9]   Found to have severe acute kidney injury, with creatinine 10,  with hyponatremia, hypokalemia,, so we are called for that. Patient unable to fully participate, currently resting in bed, with encephalopathy, lethargy. Without any other acute distress. Regarding: CONSTANTIN ,   Duration: acute , unknown baseline  Location: kidneys  Severity: Severe and critical  Timing: continous  Context (ex: related to condition):  , refer to my assessment / impression.   Modifying factors (ex: medications, interventions):  , refer to my plan / recommendation. Associated signs & symptoms (ex: edema, SOB): As mentioned above in CC and HPI    Chart reviewed, patient's pertinent electronic medical records , Medical history and medication reviewed as needed for my evaulation. Past medical, Surgical, Social, Family medical history reviewed by me. PAST MEDICAL HISTORY:   Past Medical History:   Diagnosis Date    Cataract cortical, senile, bilateral     Cognitive communication deficit     Dementia ([address]     Diabetes mellitus ([address]     type II    Hyperlipidemia      PAST SURGICAL HISTORY: No past surgical history reported  FAMILY HISTORY: Unable to obtain family history due to encephalopathy  SOCIAL HISTORY:   Social History     Socioeconomic History    Marital status:      Spouse name: None    Number of children: None    Years of education: None    Highest education level: None   Tobacco Use    Smoking status: Unknown          MEDICATIONS: reviewed by me. Medications Prior to Admission:  No current facility-administered medications on file prior to encounter.      Current Outpatient Medications on File Prior to Encounter   Medication Sig Dispense Refill    insulin glargine (LANTUS) 100 UNIT/ML injection vial Inject 46 Units into the skin nightly      tamsulosin (FLOMAX) 0.4 MG capsule Take 0.4 mg by mouth daily      metFORMIN (GLUCOPHAGE) 1000 MG tablet Take 1,000 mg by mouth 2 times daily (with meals)      rosuvastatin (CRESTOR) 40 MG tablet Take 40 mg by mouth every evening      insulin aspart (NOVOLOG) 100 UNIT/ML injection cartridge Inject into the skin 3 times daily (before meals) Sliding scale  BG 0-149= 0 units  -200= 2 units  -250= 4 units  -300= 6 units  -350= 8 units  -400= 10 units  Anything over 400, call MD for orders      acetaminophen (TYLENOL) 325 MG tablet Take 650 mg by mouth every 4 hours as needed for Pain           Current Facility-Administered Medications: insulin glargine (LANTUS) injection vial 46 Units, 46 Units, SubCUTAneous, Nightly, Renetta Horne MD    [facility]) capsule 0.4 mg, 0.4 mg, Oral, Daily, Renetta Horne MD, 0.4 mg at 12/04/22 0911    rosuvastatin (CRESTOR) tablet 40 mg, 40 mg, Oral, QPM, Renetta Horne MD    insulin lispro (HUMALOG) injection vial 1-10 Units, 1-10 Units, SubCUTAneous, 4x Daily AC & HS, Renetta Horne MD, 4 Units at 12/04/22 0919    sodium chloride flush 0.9 % injection 5-40 mL, 5-40 mL, IntraVENous, 2 times per day, Renetta Horne MD, 10 mL at 12/04/22 0910    sodium chloride flush 0.9 % injection 10 mL, 10 mL, IntraVENous, PRN, Renetta Horne MD    0.9 % sodium chloride infusion, , IntraVENous, PRN, Renetta Horne MD    ondansetron (ZOFRAN-ODT) disintegrating tablet 4 mg, 4 mg, Oral, Q8H PRN **OR** ondansetron (ZOFRAN) injection 4 mg, 4 mg, IntraVENous, Q6H PRN, Renetta Horne MD, 4 mg at 12/04/22 0327    magnesium hydroxide (MILK OF MAGNESIA) 400 MG/5ML suspension 30 mL, 30 mL, Oral, Daily PRN, Renetta Horne MD    acetaminophen (TYLENOL) tablet 650 mg, 650 mg, Oral, Q6H PRN **OR** acetaminophen (TYLENOL) suppository 650 mg, 650 mg, Rectal, Q6H PRN, Renetta Horne MD    [START ON 12/5/2022] cefepime (MAXIPIME) 1000 mg IVPB minibag, 1,000 mg, IntraVENous, Q24H, Renetta Horne MD    vancomycin [facility] [place]) intermittent dosing (placeholder), , Other, RX Placeholder, Renetta Horne MD    albuterol (PROVENTIL) nebulizer solution 2.5 mg, 2.5 mg, Nebulization, Q4H PRN, Renetta Horne MD    ipratropium-albuterol (DUONEB) nebulizer solution 1 ampule, 1 ampule, Inhalation, 4x daily, Renetta Horne MD, 1 ampule at 12/04/22 0835    heparin (porcine) injection 5,000 Units, 5,000 Units, SubCUTAneous, 3 times per day, Renetta Horne MD, 5,000 Units at 12/04/22 0920    vancomycin 1000 mg IVPB in 250 mL D5W addavial, 15 mg/kg, IntraVENous, Once, Renetta Horne MD, Last Rate: 250 mL/hr at 12/04/22 0910, 1,000 mg at 12/04/22 5048    glucose-vitamin C chewable tablet 4 tablet, 4 tablet, Oral, PRN, Buena Lesches, MD    dextrose bolus 10% 125 mL, 125 mL, IntraVENous, PRN **OR** dextrose bolus 10% 250 mL, 250 mL, IntraVENous, PRN, Buena Lesches, MD    glucagon (rDNA) injection 1 mg, 1 mg, SubCUTAneous, PRN, Buena Lesches, MD    dextrose 10 % infusion, , IntraVENous, Continuous PRN, Buena Lesches, MD    hydrALAZINE (APRESOLINE) injection 10 mg, 10 mg, IntraVENous, Q6H PRN, Buena Lesches, MD    0.9 % sodium chloride bolus, 500 mL, IntraVENous, PRN, Buena Lesches, MD    sodium bicarbonate 150 mEq in dextrose 5 % 1,150 mL infusion, , IntraVENous, Continuous, Joellen Hamilton MD      Allergies reviewed by me: Aspirin    REVIEW OF SYSTEMS:  Unable to obtain family history due to encephalopathy       =======================================================================================     PHYSICAL EXAM:  Recent vital signs and recent I/Os reviewed by me.      Wt Readings from Last 3 Encounters:   12/04/22 163 lb 1.6 oz (74 kg)     BP Readings from Last 3 Encounters:   12/04/22 (!) 104/57     Patient Vitals for the past 24 hrs:   BP Temp Temp src Pulse Resp SpO2 Height Weight   12/04/22 0900 (!) 104/57 96.8 °F (36 °C) Axillary 94 30 95 % -- --   12/04/22 0835 -- -- -- 96 20 96 % -- --   12/04/22 0429 -- -- -- -- -- -- 5' 10\" (1.778 m) 163 lb 1.6 oz (74 kg)   12/04/22 0401 -- -- -- -- -- 95 % -- --   12/04/22 0358 -- -- -- 94 20 97 % -- --   12/04/22 0308 (!) 138/59 97.5 °F (36.4 °C) Axillary -- 20 94 % -- --   12/04/22 0145 -- -- -- -- -- 100 % -- --   12/04/22 0130 121/81 -- -- -- -- 96 % -- --   12/04/22 0048 111/66 -- -- -- -- 94 % -- --   12/04/22 0030 122/81 -- -- -- -- -- -- --   12/04/22 0029 -- -- -- -- -- (!) 87 % -- --   12/04/22 0020 -- -- -- -- -- 91 % -- --   12/04/22 0000 -- -- -- -- -- (!) 86 % -- --   12/03/22 2352 -- -- -- -- -- -- -- 180 lb 4.8 oz (81.8 kg)   12/03/22 2315 137/72 -- -- 97 25 96 % -- --   12/03/22 2245 119/77 -- -- 99 27 95 % -- --   12/03/22 2230 132/82 -- -- 100 26 94 % -- --   12/03/22 2216 116/84 98.4 °F (36.9 °C) Oral 99 27 94 % -- --       Intake/Output Summary (Last 24 hours) at 12/4/2022 0944  Last data filed at 12/4/2022 0600  Gross per 24 hour   Intake 0 ml   Output 0 ml   Net 0 ml         Physical Exam  Vitals reviewed. Constitutional:       General: He is not in acute distress. Appearance: Normal appearance. He is ill-appearing. HENT:      Head: Normocephalic and atraumatic. Right Ear: External ear normal.      Left Ear: External ear normal.      Nose: Nose normal.      Mouth/Throat:      Mouth: Mucous membranes are dry. Eyes:      General: No scleral icterus. Conjunctiva/sclera: Conjunctivae normal.   Neck:      Vascular: No JVD. Cardiovascular:      Rate and Rhythm: Normal rate and regular rhythm. Heart sounds: S1 normal and S2 normal.   Pulmonary:      Effort: Pulmonary effort is normal. No respiratory distress. Breath sounds: Rhonchi present. Abdominal:      General: Bowel sounds are normal. There is no distension. Musculoskeletal:         General: No swelling or deformity. Cervical back: Normal range of motion and neck supple. Skin:     General: Skin is dry. Coloration: Skin is not jaundiced. Neurological:      Mental Status: He is disoriented. Comments: Unable to obtain family history due to encephalopathy   Psychiatric:      Comments: Unable to obtain family history due to encephalopathy          =======================================================================================     DATA:  Diagnostic tests reviewed by me for today's visit:   (AS NEEDED FOR MY EVALUATION AND MANAGEMENT).        Recent Labs     12/03/22  2250   WBC 22.3*   HCT 54.4*        Iron Saturation:  No components found for: PERCENTFE  FERRITIN:  No results found for: FERRITIN  IRON:  No results found for: IRON  TIBC:  No results found for: TIBC    Recent Labs     12/03/22  2250   *   K 5.4*      CO2 11*   *   CREATININE 10.5*     Recent Labs     12/03/22  2250   CALCIUM 10.8*     No results for input(s): PH, PCO2, PO2 in the last 72 hours. Invalid input(s): Alyse Pimentel    ABG:  No results found for: PH, PCO2, PO2, HCO3, BE, THGB, TCO2, O2SAT  VBG:  No results found for: PHVEN, QDD4IBM, BEVEN, C8AEBODS    LDH:  No results found for: LDH  Uric Acid:  No results found for: LABURIC, URICACID    PT/INR:  No results found for: PROTIME, INR  Warfarin PT/INR:  No components found for: PTPATWAR, PTINRWAR  PTT:  No results found for: APTT, PTT[APTT}  Last 3 Troponin:    Lab Results   Component Value Date/Time    TROPONINI 0.14 12/03/2022 10:50 PM       U/A:  No results found for: NITRITE, COLORU, PROTEINU, PHUR, LABCAST, WBCUA, RBCUA, MUCUS, TRICHOMONAS, YEAST, BACTERIA, CLARITYU, SPECGRAV, LEUKOCYTESUR, UROBILINOGEN, BILIRUBINUR, BLOODU, GLUCOSEU, AMORPHOUS  Microalbumen/Creatinine ratio:  No components found for: RUCREAT  24 Hour Urine for Protein:  No components found for: RAWUPRO, UHRS3, WRNU31AE, UTV3  24 Hour Urine for Creatinine Clearance:  No components found for: CREAT4, UHRS10, UTV10  Urine Toxicology:  No components found for: IAMMENTA, IBARBIT, IBENZO, ICOCAINE, IMARTHC, IOPIATES, IPHENCYC    HgBA1c:  No results found for: LABA1C  RPR:  No results found for: RPR  HIV:  No results found for: HIV  MANUEL:  No results found for: ANATITER, MANUEL  RF:  No results found for: RF  DSDNA:  No components found for: DNA  AMYLASE:  No results found for: AMYLASE  LIPASE:    Lab Results   Component Value Date/Time    LIPASE 18.0 12/03/2022 10:50 PM     Fibrinogen Level:  No components found for: FIB       BELOW MENTIONED RADIOLOGY STUDY RESULTS BY ME (AS NEEDED FOR MY EVALUATION AND MANAGEMENT). XR CHEST PORTABLE    Result Date: 12/3/2022  EXAMINATION: ONE XRAY VIEW OF THE CHEST 12/3/2022 10:56 pm COMPARISON: None.  HISTORY: ORDERING SYSTEM PROVIDED HISTORY: SOB TECHNOLOGIST PROVIDED HISTORY: Reason for exam:->SOB Reason for Exam: SOB FINDINGS: Cardiac and mediastinal silhouettes appear within normal limits for size. Pulmonary vascularity is normal.  No pulmonary edema. Linear right perihilar airspace disease. No pneumothorax. No acute osseous abnormality. Focal right perihilar linear airspace disease which may represent atelectasis or pneumonia. Lungs are otherwise clear. CT CHEST ABDOMEN PELVIS WO CONTRAST Additional Contrast? None    Result Date: 12/4/2022  EXAMINATION: CT OF THE CHEST, ABDOMEN, AND PELVIS WITHOUT CONTRAST 12/3/2022 11:34 pm TECHNIQUE: CT of the chest, abdomen and pelvis was performed without the administration of intravenous contrast. Multiplanar reformatted images are provided for review. Automated exposure control, iterative reconstruction, and/or weight based adjustment of the mA/kV was utilized to reduce the radiation dose to as low as reasonably achievable. COMPARISON: None HISTORY: ORDERING SYSTEM PROVIDED HISTORY: mass right side of abdomen TECHNOLOGIST PROVIDED HISTORY: Reason for exam:->mass right side of abdomen Additional Contrast?->None Decision Support Exception - unselect if not a suspected or confirmed emergency medical condition->Emergency Medical Condition (MA) Reason for Exam: Failure To Thrive (Pt via EMS from [facility], started aricept 4 days ago for alzheimers, pt usually alert and talking, now pt having problems speaking, not eating or drinking, obvious swelling to right side abdomen, rash noted to both arms, hx of diabetes, bs 133 in squad) FINDINGS: CT CHEST: Mediastinum no enlarged lymphadenopathy. Aorta is unremarkable. Heart is normal. no effusion. Lungs/Pleura consolidation in the right upper lobe. This is along the major fissure. Mild respiratory motion. Atelectasis in the right lower lobe. Trachea and bronchi are patent. Bones spondylosis.   No lytic destructive lesions. Right upper lobe pneumonia. Follow-up radiographs to resolution are recommended. CT ABDOMEN AND PELVIS Organs Liver, spleen, adrenals are unremarkable in appearance. No hydronephrosis. Large cyst on the left kidney. This has the Hounsfield units of 18 in it measures 4 cm transverse by 4.1 cm anterior to posterior. GI No small bowel dilation. Co colonic wall thickening. Stomach is unremarkable. No evidence for bowel obstruction. The cecum is a mildly thickened wall but this is not an annular constricting lesion. No small bowel dilation. Scattered colonic diverticula but no adjacent fatty stranding. Aorta No aneurysm. Atherosclerotic changes. Peritoneum/retroperitoneum No free fluid. No free gas. No enlarged lymphadenopathy. Other the prostate is enlarged and has multiple calcifications. It measures approximately 6.7 cm transverse by 4.8 cm anterior to posterior by 7.4 cm craniocaudal.  Multiple phleboliths within the pelvis. The bladder is not have a thickened wall. Bones large anterior spurs at multiple levels. Loss of lumbar lordosis. No lytic destructive lesions. Facet arthropathy at multiple levels. Mild osteoarthritic changes at the SI joints. IMPRESSION: 1. Mild thickening of the cecum but no definite mass. This is not well evaluated without contrast. 2. Prostate is markedly enlarged. Imaging: [unfilled]         ======================================================================  Please note that this chart entry has been generated using using voice recognition software, mainly. So please excuse brevity and/or typos. While every effort and attempts have been made to ensure the accuracy of this automated transcription, some errors may have occurred; and certain words and phrases in transcription may not be entered as intended. However, inadvertent computerized transcription errors may be present. So please contact us if any clarification needed.

## 2022-12-04 NOTE — PROGRESS NOTES
Call placed to [facility] at this time to obtain medication list, nursing staff at [facility] to fax over med list.

## 2022-12-04 NOTE — PLAN OF CARE
Problem: Pain  Goal: Verbalizes/displays adequate comfort level or baseline comfort level  Outcome: Progressing     Problem: Skin/Tissue Integrity  Goal: Absence of new skin breakdown  Description: 1. Monitor for areas of redness and/or skin breakdown  2. Assess vascular access sites hourly  3. Every 4-6 hours minimum:  Change oxygen saturation probe site  4. Every 4-6 hours:  If on nasal continuous positive airway pressure, respiratory therapy assess nares and determine need for appliance change or resting period. Outcome: Progressing     Problem: Safety - Adult  Goal: Free from fall injury  Outcome: Progressing     Problem: Confusion  Goal: Confusion, delirium, dementia, or psychosis is improved or at baseline  Description: INTERVENTIONS:  1. Assess for possible contributors to thought disturbance, including medications, impaired vision or hearing, underlying metabolic abnormalities, dehydration, psychiatric diagnoses, and notify attending LIP  2. [place] high risk fall precautions, as indicated  3. Provide frequent short contacts to provide reality reorientation, refocusing and direction  4. Decrease environmental stimuli, including noise as appropriate  5. Monitor and intervene to maintain adequate nutrition, hydration, elimination, sleep and activity  6. If unable to ensure safety without constant attention obtain sitter and review sitter guidelines with assigned personnel  7. Initiate Psychosocial CNS and Spiritual Care consult, as indicated  Outcome: Progressing     Continue with plan of care.

## 2022-12-04 NOTE — ED PROVIDER NOTES
[address] [facility]        Pt Name: Eva Walden  MRN: [id number]  Janegfgricelda 1940  Date of evaluation: 12/3/2022  Provider: JAMISON Bolton - [place]  PCP: Dari Lawson  Note Started: 11:15 PM EST 12/3/22           I have seen and evaluated this patient with my supervising physician Alexander Austin MD.    CHIEF COMPLAINT       Chief Complaint   Patient presents with    Failure To Thrive     Pt via EMS from [facility], started aricept 4 days ago for alzheimers, pt usually alert and talking, now pt having problems speaking, not eating or drinking, obvious swelling to right side abdomen, rash noted to both arms, hx of diabetes, bs 133 in squad       HISTORY OF PRESENT ILLNESS   (Location, Timing/Onset, Context/Setting, Quality, Duration, Modifying Factors, Severity, Associated Signs and Symptoms)  Note limiting factors. Chief Complaint: not eating or drinking     Eva Walden is a 81 y.o. male who presents to the emergency department with complaint of not eating or drinking. The patient is from [facility] nursing home, report states that this patient was started on Aricept 4 days ago for treatment of dementia but medication was discontinued yesterday, they report that the patient is generally alert and jovial, visiting with others however now he is \"having problems speaking\" also not eating or drinking. Concern for a dry rash that appears itchy to bilateral upper arms. Swelling to the right side of the abdomen. Nursing Notes were all reviewed and agreed with or any disagreements were addressed in the HPI. REVIEW OF SYSTEMS    (2-9 systems for level 4, 10 or more for level 5)     Review of Systems   Constitutional:  Positive for activity change, appetite change and fatigue. Negative for chills and fever. Respiratory:  Negative for chest tightness and shortness of breath.     Cardiovascular:  Negative for chest pain.   Gastrointestinal:  Negative for abdominal pain, diarrhea, nausea and vomiting. Genitourinary:  Negative for dysuria. Skin:         Mass right side of abdomen   All other systems reviewed and are negative. Positives and Pertinent negatives as per HPI. Except as noted above in the ROS, all other systems were reviewed and negative. PAST MEDICAL HISTORY   No past medical history on file. SURGICAL HISTORY   No past surgical history on file. CURRENTMEDICATIONS       Previous Medications    ACETAMINOPHEN (TYLENOL) 325 MG TABLET    Take 650 mg by mouth every 4 hours as needed for Pain         ALLERGIES     Patient has no allergy information on record. FAMILYHISTORY     No family history on file. SOCIAL HISTORY          SCREENINGS             PHYSICAL EXAM    (up to 7 for level 4, 8 or more for level 5)     ED Triage Vitals [12/03/22 2216]   BP Temp Temp Source Heart Rate Resp SpO2 Height Weight   116/84 98.4 °F (36.9 °C) Oral 99 27 94 % -- --       Physical Exam  Vitals and nursing note reviewed. Constitutional:       Appearance: He is well-developed. He is ill-appearing. He is not diaphoretic. HENT:      Head: Normocephalic and atraumatic. Right Ear: External ear normal.      Left Ear: External ear normal.      Mouth/Throat:      Mouth: Mucous membranes are dry. Eyes:      General:         Right eye: No discharge. Left eye: No discharge. Neck:      Vascular: No JVD. Cardiovascular:      Rate and Rhythm: Tachycardia present. Pulses: Normal pulses. Pulmonary:      Effort: Pulmonary effort is normal. No respiratory distress. Abdominal:      Palpations: Abdomen is soft. Comments: Large mass to the right side of the abdomen, soft, non-tender to palpation. No surround redness, streaking, or warmth. Musculoskeletal:         General: Normal range of motion. Skin:     General: Skin is warm and dry. Coloration: Skin is not pale.    Neurological: Mental Status: He is alert. Comments: Patient is awake, alert, and does respond when spoken to. His speech is slow. His throat sounds very dry, his mucous membranes are very dry.   Nonambulatory at baseline   Psychiatric:         Behavior: Behavior normal.       DIAGNOSTIC RESULTS   LABS:    Labs Reviewed   CBC WITH AUTO DIFFERENTIAL - Abnormal; Notable for the following components:       Result Value    WBC 22.3 (*)     RBC 6.59 (*)     Hemoglobin 17.6 (*)     Hematocrit 54.4 (*)     RDW 16.8 (*)     Neutrophils Absolute 18.9 (*)     Monocytes Absolute 1.9 (*)     All other components within normal limits   COMPREHENSIVE METABOLIC PANEL - Abnormal; Notable for the following components:    Sodium 152 (*)     Potassium 5.4 (*)     CO2 11 (*)     Anion Gap 34 (*)     Glucose 151 (*)      (*)     Creatinine 10.5 (*)     Est, Glom Filt Rate 4 (*)     Calcium 10.8 (*)     All other components within normal limits    Narrative:     Geryl Mess  [facility] tel. [phone],  Chemistry results called to and read back by Chanel Gupta, 12/03/2022 23:28,  by [facility]  Chemistry results called to and read back by Chanel Gupta, 12/03/2022 23:28,  by [facility]   LACTATE, SEPSIS - Abnormal; Notable for the following components:    Lactic Acid, Sepsis 6.0 (*)     All other components within normal limits    Narrative:     [facility] tel. [phone],  Chemistry results called to and read back by Mp Lee, 12/03/2022 23:16,  by [facility]   TROPONIN - Abnormal; Notable for the following components:    Troponin 0.14 (*)     All other components within normal limits    Narrative:     [facility] tel. [phone],  Chemistry results called to and read back by Chanel Gupta, 12/03/2022 23:28,  by [facility]CO   COVID-19, RAPID   RAPID INFLUENZA A/B ANTIGENS   CULTURE, BLOOD 1   CULTURE, BLOOD 2   LIPASE    Narrative:     Geryl Mess  [facility] tel. [phone],  Chemistry results called to and read back by Chanel Gupta, 12/03/2022 23:28,  by [facility]   LACTATE, SEPSIS   URINALYSIS WITH REFLEX TO CULTURE       When ordered only abnormal lab results are displayed. All other labs were within normal range or not returned as of this dictation. EKG: When ordered, EKG's are interpreted by the Emergency Department Physician in the absence of a cardiologist.  Please see their note for interpretation of EKG. RADIOLOGY:   Non-plain film images such as CT, Ultrasound and MRI are read by the radiologist. Plain radiographic images are visualized and preliminarily interpreted by the ED Provider with the below findings:        Interpretation per the Radiologist below, if available at the time of this note:    CT CHEST ABDOMEN PELVIS WO CONTRAST Additional Contrast? None   Final Result      Right upper lobe pneumonia. Follow-up radiographs to resolution are   recommended. CT ABDOMEN AND PELVIS      Organs Liver, spleen, adrenals are unremarkable in appearance. No   hydronephrosis. Large cyst on the left kidney. This has the Hounsfield   units of 18 in it measures 4 cm transverse by 4.1 cm anterior to posterior. GI No small bowel dilation. Co colonic wall thickening. Stomach is   unremarkable. No evidence for bowel obstruction. The cecum is a mildly   thickened wall but this is not an annular constricting lesion. No small   bowel dilation. Scattered colonic diverticula but no adjacent fatty   stranding. Aorta No aneurysm. Atherosclerotic changes. Peritoneum/retroperitoneum No free fluid. No free gas. No enlarged   lymphadenopathy. Other the prostate is enlarged and has multiple calcifications. It measures   approximately 6.7 cm transverse by 4.8 cm anterior to posterior by 7.4 cm   craniocaudal.  Multiple phleboliths within the pelvis. The bladder is not   have a thickened wall. Bones large anterior spurs at multiple levels. Loss of lumbar lordosis. No   lytic destructive lesions. Facet arthropathy at multiple levels. Mild   osteoarthritic changes at the SI joints. IMPRESSION:   1. Mild thickening of the cecum but no definite mass. This is not well   evaluated without contrast.   2. Prostate is markedly enlarged. XR CHEST PORTABLE   Final Result   Focal right perihilar linear airspace disease which may represent atelectasis   or pneumonia. Lungs are otherwise clear. No results found. PROCEDURES   Unless otherwise noted below, none     Procedures    CRITICAL CARE TIME   There was a high probability of life-threatening clinical deterioration in the patient's condition requiring my urgent intervention. I personally saw the patient and independently provided 60 minutes of non-concurrent critical care out of the total shared critical care time provided, excluding separately reportable procedures. CONSULTS:  None      EMERGENCY DEPARTMENT COURSE and DIFFERENTIAL DIAGNOSIS/MDM:   Vitals:    Vitals:    12/04/22 0030 12/04/22 0048 12/04/22 0130 12/04/22 0145   BP: 122/81 111/66 121/81    Pulse:       Resp:       Temp:       TempSrc:       SpO2:  94% 96% 100%   Weight:           Patient was given the following medications:  Medications   cefepime (MAXIPIME) 2000 mg IVPB minibag (has no administration in time range)   vancomycin 1000 mg IVPB in 250 mL D5W addavial (1,000 mg IntraVENous Incomplete 12/4/22 0050)   0.9 % sodium chloride bolus (1,000 mLs IntraVENous New Bag 12/3/22 2306)         Is this patient to be included in the SEP-1 Core Measure due to severe sepsis or septic shock? Yes   SEP-1 CORE MEASURE DATA      Sepsis Criteria   Severe Sepsis Criteria   Septic Shock Criteria     Must be confirmed or suspected to move forward with diagnosis of sepsis. Must meet 2:    [x] Temperature > 100.9 F (38.3 C)        or < 96.8 F (36 C)  [] HR > 90  [] RR > 20  [] WBC > 12 or < 4 or 10% bands      AND:      [x] Infection Confirmed or        Suspected.      Must meet 1:    [x] Lactate > 2       or [x] Signs of Organ Dysfunction:    - SBP < 90 or MAP < 65  - Altered mental status  - Creatinine > 2 or increased from      baseline  - Urine Output < 0.5 ml/kg/hr  - Bilirubin > 2  - INR > 1.5 (not anticoagulated)  - Platelets < 276,364  - Acute Respiratory Failure as     evidenced by new need for NIPPV     or mechanical ventilation      [] No criteria met for Severe Sepsis. Must meet 1:    [x] Lactate > 4        or   [] SBP < 90 or MAP < 65 for at        least two readings in the first        hour after fluid bolus        administration      [] Vasopressors initiated (if hypotension persists after fluid resuscitation)        [] No criteria met for Septic Shock. Patient Vitals for the past 6 hrs:   BP Temp Pulse Resp SpO2 Weight Weight Method Percent Weight Change   12/03/22 2216 116/84 98.4 °F (36.9 °C) 99 27 94 % -- -- --   12/03/22 2230 132/82 -- 100 26 94 % -- -- --   12/03/22 2245 119/77 -- 99 27 95 % -- -- --   12/03/22 2315 137/72 -- 97 25 96 % -- -- --   12/03/22 2352 -- -- -- -- -- 180 lb 4.8 oz (81.8 kg) Bed scale 0   12/04/22 0000 -- -- -- -- (!) 86 % -- -- --   12/04/22 0020 -- -- -- -- 91 % -- -- --   12/04/22 0029 -- -- -- -- (!) 87 % -- -- --   12/04/22 0030 122/81 -- -- -- -- -- -- --   12/04/22 0048 111/66 -- -- -- 94 % -- -- --   12/04/22 0130 121/81 -- -- -- 96 % -- -- --   12/04/22 0145 -- -- -- -- 100 % -- -- --      Recent Labs     12/03/22  2250   WBC 22.3*   CREATININE 10.5*   BILITOT 0.4            Time Severe Sepsis Identified: 2330    Fluid Resuscitation Rational: at least 30mL/kg based on entered actual weight at time of triage    Repeat lactate level: ordered and pending at this time    Reassessment Exam:   Not applicable. Patient does not have septic shock. This is an 81-year-old male that presents [facility] with decreased level of consciousness, concern for dehydration and possible infection.   Patient does have history of dementia, he was started on Aricept 4 days ago, this medication was discontinued by the facility yesterday for decreased level of consciousness, not eating or drinking. I did meet the patient upon arrival, he is awake and alert, his voice does sound very dry, upon inspection, he is mucous membranes are very dry. 2 IVs were established and the patient was given 30 mL/kg of IV fluids for concern of severe sepsis/septic shock. CBC does show white blood cell count of 22.3 with a hemoglobin of 17.6. CMP concerning for kidney failure/dehydration with a sodium of 152 potassium 5.4 bicarb of 11  and creatinine of 10.5. Patient GFR tonight is 4. Lactate is 6.0, repeat lactate is pending. Lipase 18. Troponin elevated 0.14, EKG reviewed by attending physician. COVID is negative. Influenza negative. CT CHEST ABDOMEN PELVIS WO CONTRAST Additional Contrast? None (Final result)  Result time 12/04/22 01:13:10  Final result by Carie Aguila MD (12/04/22 01:13:10)                Impression:      Right upper lobe pneumonia. Follow-up radiographs to resolution are   recommended. Radiology does not discussed the mass seen on the right side of the abdomen, this does appear to be soft tissue in nature. Should be followed up during admission. Patient's family was extensively updated by myself on 3 separate occasions. I did speak with the [place] [facility] twice, once initially to gather information as well as to confirm DNR CC status and then again at 0149 to update regarding plan of care with admission and treatment for right upper lobe pneumonia. She is agreeable regarding plan of care. She does confirm that her [facility] status. I also spoke with patient's daughter, Ritu November, she did call me back first and alerted me that Raad Watson was the [address] [place] [address]. His daughter has also been thoroughly updated, all questions were answered.     Patient did require 15 L on a nonrebreather of oxygen for short amount of time, I did ask nursing to remove the nonrebreather as he has 100% on this 100% oxygen. Will wean as appropriate. Patient is sleeping, able to arouse with verbal stimuli. Patient is excepted for admission by Dr. Aroldo Parham. He does have a confirmed CODE STATUS of DNR CC. FINAL IMPRESSION      1. Severe sepsis ([facility] [address]    2. Acute renal failure, unspecified acute renal failure type ([facility] [address]    3. Goals of care, counseling/discussion    4. Pneumonia of right upper lobe due to infectious organism          DISPOSITION/PLAN   DISPOSITION Decision To Admit 12/04/2022 01:43:35 AM      PATIENT REFERRED TO:  No follow-up provider specified.     DISCHARGE MEDICATIONS:  New Prescriptions    No medications on file       DISCONTINUED MEDICATIONS:  Discontinued Medications    No medications on file              (Please note that portions of this note were completed with a voice recognition program.  Efforts were made to edit the dictations but occasionally words are mis-transcribed.)    JAMISON Pitts CNP (electronically signed)            JAMISON Pitts CNP  12/04/22 0151

## 2022-12-04 NOTE — PROGRESS NOTES
Nephrology Consult received - full consult note to follow. Discussed with nurse. Orders placed. No need for urgent Dialysis today, but needs close monitoring. Thank you for allowing us to participate in this patients care. Please do not hesitate to contact, if any questions or concerns. We will follow along with you. Adalid Puentes MD  [facility] [address] [place] [address]   [phone] or Via [email or web address] [place]. <BR >

## 2022-12-04 NOTE — ED PROVIDER NOTES
tissue prominence between the iliac crest and the costal margin on the right that is easy to palpate and is nontender no discrete mass   Extremities:  Full range of motion. no deformity   Pulses: Equal  upper and lower    Skin:  No rashes or lesions to exposed skin. Neurologic: drowsy and oriented X 3. Motor grossly normal.  Speech clear. Cr n 2-12 intact   Patient was given fluids coverage for pneumonia his primary care physician was notified and will admit the patient DNR CC      1. Severe sepsis ([facility] [address]    2. Acute renal failure, unspecified acute renal failure type ([facility] [address]          DISPOSITION/PLAN  PATIENT REFERRED TO:  No follow-up provider specified. DISCHARGE MEDICATIONS:  New Prescriptions    No medications on file         Is this patient to be included in the SEP-1 Core Measure due to severe sepsis or septic shock? Yes   SEP-1 CORE MEASURE DATA      Sepsis Criteria   Severe Sepsis Criteria   Septic Shock Criteria     Must be confirmed or suspected to move forward with diagnosis of sepsis. Must meet 2:    [] Temperature > 100.9 F (38.3 C)        or < 96.8 F (36 C)  [] HR > 90  [] RR > 20  [] WBC > 12 or < 4 or 10% bands      AND:      [] Infection Confirmed or        Suspected. Must meet 1:    [x] Lactate > 2       or   [x] Signs of Organ Dysfunction:    - SBP < 90 or MAP < 65  - Altered mental status  - Creatinine > 2 or increased from      baseline  - Urine Output < 0.5 ml/kg/hr  - Bilirubin > 2  - INR > 1.5 (not anticoagulated)  - Platelets < 225,654  - Acute Respiratory Failure as     evidenced by new need for NIPPV     or mechanical ventilation      [] No criteria met for Severe Sepsis. Must meet 1:    [] Lactate > 4        or   [] SBP < 90 or MAP < 65 for at        least two readings in the first        hour after fluid bolus        administration      [] Vasopressors initiated (if hypotension persists after fluid resuscitation)        [] No criteria met for Septic Shock.    Patient Vitals for the past 6 hrs:   BP Temp Pulse Resp SpO2 Weight Weight Method Percent Weight Change   12/03/22 2216 116/84 98.4 °F (36.9 °C) 99 27 94 % -- -- --   12/03/22 2230 132/82 -- 100 26 94 % -- -- --   12/03/22 2245 119/77 -- 99 27 95 % -- -- --   12/03/22 2315 137/72 -- 97 25 96 % -- -- --   12/03/22 2352 -- -- -- -- -- 180 lb 4.8 oz (81.8 kg) Bed scale 0   12/04/22 0000 -- -- -- -- (!) 86 % -- -- --   12/04/22 0020 -- -- -- -- 91 % -- -- --   12/04/22 0029 -- -- -- -- (!) 87 % -- -- --   12/04/22 0030 122/81 -- -- -- -- -- -- --   12/04/22 0048 111/66 -- -- -- 94 % -- -- --   12/04/22 0130 121/81 -- -- -- 96 % -- -- --      Recent Labs     12/03/22  2250   WBC 22.3*   CREATININE 10.5*   BILITOT 0.4            Time Severe Sepsis Identified: 2400    Fluid Resuscitation Rational: at least 30mL/kg based on entered actual weight at time of triage    Repeat lactate level: ordered and pending at this time    Reassessment Exam:   Not applicable. Patient does not have septic shock.       MD Iram Florian MD  12/04/22 1997

## 2022-12-04 NOTE — PROGRESS NOTES
Clinical Pharmacy Note: Pharmacy to Dose Vancomycin    Vancomycin Day: 1  Indication: HAP  Current Dose: N/A - dose by levels  Dosing Method: Dosing by random levels    Random: 10.4    Recent Labs     12/03/22  2250   *       Recent Labs     12/03/22  2250   CREATININE 10.5*       Recent Labs     12/03/22  2250   WBC 22.3*       No intake or output data in the 24 hours ending 12/04/22 0719      Ht Readings from Last 1 Encounters:   12/04/22 5' 10\" (1.778 m)        Wt Readings from Last 1 Encounters:   12/04/22 163 lb 1.6 oz (74 kg)         Body mass index is 23.4 kg/m². Estimated Creatinine Clearance: 6 mL/min (A) (based on SCr of 10.5 mg/dL [facility])). Assessment/Plan:  Vancomycin level is subtherapeutic. Will redose vancomycin 1000mg one time today. A vancomycin random level has been ordered on 12/5/22 at 0600 for follow-up. Changes in regimen will be determined based on culture results, renal function, and clinical response. Pharmacy will continue to monitor and adjust regimen as necessary.     Thank you for the consult,    Mariela Ross, PharmD, BCCCP

## 2022-12-04 NOTE — PROGRESS NOTES
Speech Language Pathology  Aspiration Screen    Name: Bridgette Guerin  : 1940  Medical Diagnosis: Hospital-acquired pneumonia [J18.9, Y95]  Goals of care, counseling/discussion [Z71.89]  Severe sepsis ([facility] [address] [A41.9, R65.20]  Acute renal failure, unspecified acute renal failure type ([address] [N17.9]  Pneumonia of right upper lobe due to infectious organism [J18.9]    Swallow screen to rule out aspiration completed per pneumonia protocol. Patient demonstrates some high risk indicators for potential dysphagia / aspiration per swallow screen. RECOMMEND: Clinical Swallow Evaluation at bedside to assess swallowing function, rule out aspiration, and determine appropriate diet level. Thanks,  Gibran LEMUS  CCC-SLP #[id number] 2022 2:32 PM  Speech-Language Pathologist

## 2022-12-05 VITALS
WEIGHT: 163.1 LBS | OXYGEN SATURATION: 97 % | HEART RATE: 73 BPM | SYSTOLIC BLOOD PRESSURE: 92 MMHG | TEMPERATURE: 96.5 F | DIASTOLIC BLOOD PRESSURE: 55 MMHG | RESPIRATION RATE: 18 BRPM | HEIGHT: 70 IN | BODY MASS INDEX: 23.35 KG/M2

## 2022-12-05 LAB
A/G RATIO: 1.1 (ref 1.1–2.2)
ALBUMIN SERPL-MCNC: 2.9 G/DL (ref 3.4–5)
ALP BLD-CCNC: 84 U/L (ref 40–129)
ALT SERPL-CCNC: 16 U/L (ref 10–40)
ANION GAP SERPL CALCULATED.3IONS-SCNC: 46 MMOL/L (ref 3–16)
AST SERPL-CCNC: 22 U/L (ref 15–37)
BASOPHILS ABSOLUTE: 0 K/UL (ref 0–0.2)
BASOPHILS RELATIVE PERCENT: 0.1 %
BILIRUB SERPL-MCNC: 0.4 MG/DL (ref 0–1)
BUN BLDV-MCNC: 216 MG/DL (ref 7–20)
C-REACTIVE PROTEIN: 110.7 MG/L (ref 0–5.1)
CALCIUM SERPL-MCNC: 9.4 MG/DL (ref 8.3–10.6)
CHLORIDE BLD-SCNC: 106 MMOL/L (ref 99–110)
CO2: 4 MMOL/L (ref 21–32)
CREAT SERPL-MCNC: 11.6 MG/DL (ref 0.8–1.3)
EOSINOPHILS ABSOLUTE: 0 K/UL (ref 0–0.6)
EOSINOPHILS RELATIVE PERCENT: 0 %
GFR SERPL CREATININE-BSD FRML MDRD: 4 ML/MIN/{1.73_M2}
GLUCOSE BLD-MCNC: 100 MG/DL (ref 70–99)
GLUCOSE BLD-MCNC: 88 MG/DL (ref 70–99)
GLUCOSE BLD-MCNC: 89 MG/DL (ref 70–99)
HCT VFR BLD CALC: 47.5 % (ref 40.5–52.5)
HEMOGLOBIN: 15.1 G/DL (ref 13.5–17.5)
L. PNEUMOPHILA SEROGP 1 UR AG: NORMAL
LACTIC ACID: 11 MMOL/L (ref 0.4–2)
LYMPHOCYTES ABSOLUTE: 2.1 K/UL (ref 1–5.1)
LYMPHOCYTES RELATIVE PERCENT: 9.2 %
MCH RBC QN AUTO: 26.3 PG (ref 26–34)
MCHC RBC AUTO-ENTMCNC: 31.8 G/DL (ref 31–36)
MCV RBC AUTO: 82.6 FL (ref 80–100)
MONOCYTES ABSOLUTE: 1.9 K/UL (ref 0–1.3)
MONOCYTES RELATIVE PERCENT: 8.1 %
NEUTROPHILS ABSOLUTE: 18.9 K/UL (ref 1.7–7.7)
NEUTROPHILS RELATIVE PERCENT: 82.6 %
PDW BLD-RTO: 16.8 % (ref 12.4–15.4)
PERFORMED ON: ABNORMAL
PERFORMED ON: NORMAL
PLATELET # BLD: 170 K/UL (ref 135–450)
PMV BLD AUTO: 9.4 FL (ref 5–10.5)
POTASSIUM REFLEX MAGNESIUM: 5.7 MMOL/L (ref 3.5–5.1)
RBC # BLD: 5.75 M/UL (ref 4.2–5.9)
REASON FOR REJECTION: NORMAL
REJECTED TEST: NORMAL
SODIUM BLD-SCNC: 156 MMOL/L (ref 136–145)
TOTAL PROTEIN: 5.5 G/DL (ref 6.4–8.2)
VANCOMYCIN RANDOM: 16.6 UG/ML
WBC # BLD: 22.9 K/UL (ref 4–11)

## 2022-12-05 PROCEDURE — 2580000003 HC RX 258: Performed by: INTERNAL MEDICINE

## 2022-12-05 PROCEDURE — 94640 AIRWAY INHALATION TREATMENT: CPT

## 2022-12-05 PROCEDURE — 94761 N-INVAS EAR/PLS OXIMETRY MLT: CPT

## 2022-12-05 PROCEDURE — 83605 ASSAY OF LACTIC ACID: CPT

## 2022-12-05 PROCEDURE — 2500000003 HC RX 250 WO HCPCS: Performed by: INTERNAL MEDICINE

## 2022-12-05 PROCEDURE — 86140 C-REACTIVE PROTEIN: CPT

## 2022-12-05 PROCEDURE — 80053 COMPREHEN METABOLIC PANEL: CPT

## 2022-12-05 PROCEDURE — 85025 COMPLETE CBC W/AUTO DIFF WBC: CPT

## 2022-12-05 PROCEDURE — 80202 ASSAY OF VANCOMYCIN: CPT

## 2022-12-05 PROCEDURE — 92526 ORAL FUNCTION THERAPY: CPT

## 2022-12-05 PROCEDURE — 6360000002 HC RX W HCPCS: Performed by: INTERNAL MEDICINE

## 2022-12-05 PROCEDURE — 2700000000 HC OXYGEN THERAPY PER DAY

## 2022-12-05 PROCEDURE — 6370000000 HC RX 637 (ALT 250 FOR IP): Performed by: INTERNAL MEDICINE

## 2022-12-05 PROCEDURE — 36415 COLL VENOUS BLD VENIPUNCTURE: CPT

## 2022-12-05 PROCEDURE — 92610 EVALUATE SWALLOWING FUNCTION: CPT

## 2022-12-05 RX ORDER — MORPHINE SULFATE 2 MG/ML
2 INJECTION, SOLUTION INTRAMUSCULAR; INTRAVENOUS EVERY 4 HOURS PRN
Status: DISCONTINUED | OUTPATIENT
Start: 2022-12-05 | End: 2022-12-05 | Stop reason: HOSPADM

## 2022-12-05 RX ADMIN — CEFEPIME 1000 MG: 1 INJECTION, POWDER, FOR SOLUTION INTRAMUSCULAR; INTRAVENOUS at 04:20

## 2022-12-05 RX ADMIN — HEPARIN SODIUM 5000 UNITS: 5000 INJECTION INTRAVENOUS; SUBCUTANEOUS at 00:13

## 2022-12-05 RX ADMIN — MORPHINE SULFATE 2 MG: 2 INJECTION, SOLUTION INTRAMUSCULAR; INTRAVENOUS at 15:55

## 2022-12-05 RX ADMIN — IPRATROPIUM BROMIDE AND ALBUTEROL SULFATE 1 AMPULE: .5; 3 SOLUTION RESPIRATORY (INHALATION) at 16:35

## 2022-12-05 RX ADMIN — HEPARIN SODIUM 5000 UNITS: 5000 INJECTION INTRAVENOUS; SUBCUTANEOUS at 10:15

## 2022-12-05 RX ADMIN — SODIUM CHLORIDE, PRESERVATIVE FREE 10 ML: 5 INJECTION INTRAVENOUS at 10:15

## 2022-12-05 RX ADMIN — IPRATROPIUM BROMIDE AND ALBUTEROL SULFATE 1 AMPULE: .5; 3 SOLUTION RESPIRATORY (INHALATION) at 09:40

## 2022-12-05 RX ADMIN — SODIUM BICARBONATE: 84 INJECTION, SOLUTION INTRAVENOUS at 00:16

## 2022-12-05 ASSESSMENT — PAIN SCALES - WONG BAKER
WONGBAKER_NUMERICALRESPONSE: 0

## 2022-12-05 NOTE — CONSULTS
Palliative Care: Failure To Thrive (Pt via EMS from [facility], started aricept 4 days ago for alzheimers, pt usually alert and talking, now pt having problems speaking, not eating or drinking, obvious swelling to right side abdomen, rash noted to both arms, hx of diabetes, bs 133 in squad)    H&P: 81 y.o. male who presents to the emergency department with complaint of not eating or drinking. The patient is from [facility] nursing home, report states that this patient was started on Aricept 4 days ago for treatment of dementia but medication was discontinued yesterday, they report that the patient is generally alert and jovial, visiting with others however now he is \"having problems speaking\" also not eating or drinking. Admit: 12/3/2022  Consult: 12/05/22    Past Medical History:   has a past medical history of Cataract cortical, senile, bilateral, Cognitive communication deficit, Dementia ([address] Diabetes mellitus ([facility] [address] and Hyperlipidemia. Past Surgical History:   has no past surgical history on file.     Advance Directives:        Advance Care Planning   The patient has the following advanced directives on file:  Advance Directives       Power of [address] [place] [address] Will ACP-Advance Directive ACP-Power of     Not on File Not on File Not on File Not on File     The patient has appointed the following active healthcare agents:    Primary Decision Maker: Jayla Doll - Child - [phone]    Secondary Decision Maker: Shannon De La O - Child - [phone]    Secondary Decision Maker: Nae Almaguer Child - [phone]    The Patient has the following current code status:    Code Status: [facility]    Extended Emergency Contact Information  Primary Emergency Contact: [address] Phone: [phone]  Relation: Child  Secondary Emergency Contact: [address] Phone: [phone]  Relation: Child      Symptom Assessment: Appetite/Nausea/Bowels/Fatigue:        No intake or output data in the 24 hours ending 12/05/22 1440  Diet NPO    Social History:       Family History:  family history is not on file. Psychological/Spiritual:             Family Discussion:        All MD/RN notes and personal interaction indicate that pt is not currently capable of independent decision making d/t inability to remain alert. Spoke at bedside with pt's second (of three) daughter, Sixto Zepeda, and Emiliana's daughter. Discussed pt's current status, POC, and benefits/risks of continued treatment in light of pt's code status. Discussed code status options and Sixto Zepeda indicated a desire for pt to remain DNR-CC, with the focus remaining on comfort, and verbalized a desire to discuss hospice once pt's youngest daughter arrived at bedside, ETA 1145. Case handed off to NAMITA Tinoco - see her note from this date. Will follow up and support patient/family as time allows or circumstances dictate. Please reach out via [id number], Pocket[employer], or email [email or web address] if pt condition changes significantly or if family requests support. Thank you.     Electronically signed by Leslee Winkler RN, BSN on 12/5/2022 at 2:45 PM

## 2022-12-05 NOTE — PROGRESS NOTES
Clinical Pharmacy Note: Pharmacy to Dose Vancomycin    Vancomycin Day: 2  Indication: empiric coverage   Current Dose: N/A - dosing by levels  Dosing Method: Dosing by random levels    Random: 16.6 mcg/mL     Recent Labs     12/04/22  1810 12/05/22  0830   * 216*       Recent Labs     12/04/22  1810 12/05/22  0830   CREATININE 11.5* 11.6*       Recent Labs     12/03/22  2250 12/05/22  0737   WBC 22.3* 22.9*         Intake/Output Summary (Last 24 hours) at 12/5/2022 1039  Last data filed at 12/4/2022 1154  Gross per 24 hour   Intake --   Output 75 ml   Net -75 ml         Ht Readings from Last 1 Encounters:   12/04/22 5' 10\" (1.778 m)        Wt Readings from Last 1 Encounters:   12/04/22 163 lb 1.6 oz (74 kg)         Body mass index is 23.4 kg/m². Estimated Creatinine Clearance: 5 mL/min (A) (based on SCr of 11.6 mg/dL [facility])). Assessment/Plan:  Vancomycin level is therapeutic. Will hold vancomycin today given CONSTANTIN with SCr 11.6 and no plans for dialysis yet. Conservative dosing   A vancomycin random level has been ordered on 12/6 at 0600 for follow-up and better determination of how patient is clearing vancomycin   Changes in regimen will be determined based on culture results, renal function, and clinical response. Pharmacy will continue to monitor and adjust regimen as necessary.     Thank you for the consult,    Denisse Wray, PharmD, BCPS  [id number]  12/5/2022 10:41 AM

## 2022-12-05 NOTE — PROGRESS NOTES
Hospice [facility]    Met with patient's family to discuss hospice services, hospice philosophy, levels of care and locations of care. Patient's family elected to enroll patient into hospice services for symptom management at the [place] [address] inpatient unit. Transportation scheduled for 430pm with [address] Thank you for including us in Don's care.     Migdalia Portillo RN  [facility] #: [phone]  Cell: [phone]

## 2022-12-05 NOTE — PROGRESS NOTES
Facility/Department: [address] ONCOLOGY  Initial Assessment  DYSPHAGIA BEDSIDE SWALLOW EVALUATION     Patient: Umair Maurice   : 1940   MRN: [id number]      Evaluation Date: 2022   Admitting Diagnosis: Hospital-acquired pneumonia [J18.9, Y95]  Goals of care, counseling/discussion [Z71.89]  Severe sepsis ([facility] [address] [A41.9, R65.20]  Acute renal failure, unspecified acute renal failure type ([facility] [address] [N17.9]  Pneumonia of right upper lobe due to infectious organism [J18.9]  Pain: Unable to state                                                       H&P: \"81 y.o. male who presents to the emergency department with complaint of not eating or drinking. The patient is from [facility], report states that this patient was started on Aricept 4 days ago for treatment of dementia but medication was discontinued yesterday, they report that the patient is generally alert and jovial, visiting with others however now he is \"having problems speaking\" also not eating or drinking. Per ER NP Note: \"I did speak with the Theresa LOPEZ twice, once initially to gather information as well as to confirm DNR CC status and then again at 0149 to update regarding plan of care with admission and treatment for right upper lobe pneumonia. \"  CBC does show white blood cell count of 22.3 with a hemoglobin of 17.6. CMP concerning for kidney failure/dehydration with a sodium of 152 potassium 5.4 bicarb of 11  and creatinine of 10.5. Patient GFR tonight is 4. Lactate is 6.0, repeat lactate is pending. Lipase 18. Troponin elevated 0.14, COVID is negative. To be admitted for tx of pna, renal to see for CONSTANTIN  Old chart reviewed; not much available in computer  Unclear if pt has required HD in past or not\"    Imaging:  CT Chest:   Right upper lobe pneumonia. Follow-up radiographs to resolution are   recommended.      History/Prior Level of Function:   Living Status: home   Prior Dysphagia History: Pt on a Regular texture diet with thin liquids at baseline. No prior SLP notes in chart review. Reason for referral: SLP evaluation orders received due to decline in medical status  and altered mental status    Dysphagia Impressions/Diagnosis: Oropharyngeal Dysphagia   Pt was positioned upright in bed on 2 L O2 via nasal cannula. He was lethargic and was unable to maintain alertness greater than a minute at a time. Unable to participate in oral mechanism exam. Tactile stimulation was utilized to assess reflexes. Pt with open mouth posture at rest. In response to tactile stimulation pt demonstrated defensive mouth posture with tonic bite reflex. Unable to relax despite tactile stimulation. Pt did not recognize spoon for eating task and continued to demonstrate tonic bite in response to stimulation. No reflexive pharyngeal swallows were assessed. Pt did not benefit from any strategies at this time. Pt not safe for any PO trials at this time. Recommend NPO with ongoing assessment of swallow function. Family arrived at end of evaluation and extensive education was provided re; rationale for NPO and oral care recommendations. Family verbalized understanding. Recommended Diet and Intervention 12/5/2022:  Diet Solids Recommendation:  NPO  Liquid Consistency Recommendation:  NPO Recommended form of Meds: Meds via alternative means           Compensatory Swallowing Strategies: To be determined     SHORT TERM DYSPHAGIA GOALS/PLAN OF CARE: Speech therapy for dysphagia tx 3-5 times per week during acute care stay.     Pt will functionally tolerate ongoing assessment of swallow function with diet to be determined as indicated     Dysphagia Therapeutic Intervention:  Patient/Family Education , Therapeutic Trials with SLP     Discharge Recommendations: Discharge recommendations to be determined pending ongoing follow-up during acute care stay    Patient Positioning: Upright in bed     Current Diet Level (prior to evaluation): NPO        Respiratory Status:   []Room Air   [x]O2 via nasal cannula   []Other:    Baseline Vocal Quality:  []Normal  []Dysphonic   []Aphonic   []Hoarse  []Wet  []Weak  [x]Other: no verbalizations     Volitional Cough:  Not Elicited     Volitional Swallow:   []Absent   []Delayed     []Adequate     []Required use of drink     Oral Mechanism Exam:  []WFL []Mild   [] Moderate  []Severe  [x]To be assessed  Impaired:   []Left side      []Right side    []Labial ROM/Coordination    []Labial Symmetry   []Lingual ROM/Coordination   []Lingual Symmetry  []Gag  []Other:     Oral Phase: []WFL []Mild   [] Moderate  []Severe  [x]To be assessed   []Impaired/Prolonged Mastication:   []Oral Holding:   []Spillage Left:   []Spillage Right:  []Pocketing Left:   []Pocketing Right:   []Decreased Anterior to Posterior Transit:   []Suspected Premature Bolus Loss:   []Lingual/Palatal Residue:   []Other:     Pharyngeal Phase: []WFL []Mild   [] Moderate  []Severe  [x]To be assessed   []Delayed Swallow:   []Suspected Pharyngeal Pooling:   []Decreased Laryngeal Elevation:   []Absent Swallow:  []Wet Vocal Quality:   []Throat Clearing-Immediate:   []Throat Clearing-Delayed:   []Cough-Immediate:   []Cough-Delayed:  []Change in Vital Signs:  []Suspected Delayed Pharyngeal Clearing:  []Other:     Eating Assistance:  []Independent  []Setup or clean-up assistance   [] Supervision or touching assistance   [] Partial or moderate assistance   [] Substantial or maximal assistance  [x] Dependent     EDUCATION:   Provided education regarding role of SLP, results of assessment, recommendations and general speech pathology plan of care. [] Pt verbalized understanding and agreement   [] Pt requires ongoing learning   [x] No evidence of comprehension     If patient discharges prior to next visit, this note will serve as discharge.      Treatment time:  Timed Code Treatment Minutes: 0 minutes   Total Treatment time: 25 minutes     Electronically signed by:  Maribel Flores MA CCC-SLP #[id number]  Speech Language Pathologist

## 2022-12-05 NOTE — PROGRESS NOTES
MD Kenan Vail MD Dianah Shiley, MD                  Office: [phone]                      Fax: [phone]             [facility]                   Nephrology progress note    Patient seen and examined. Immediate family at the bedside. Generalized weakness and deconditioning. Very lethargic. Thomas catheter placed. Poor urine output. Borderline hypotension. On IV fluids. Acute renal failure-severe in nature-worsening-with decreased urine output.  -  and a creatinine of 11.6. Altered mental status change-possible uremia. Multiple critical electrolyte imbalance due to severe renal failure. Hyperkalemia-severe-5.7 with EKG changes. Severe metabolic acidosis with a CO2 of 4. Hypernatremia. Plan:    Patient is not a candidate for hemodialysis given severe dementia. - I do not see any reversible factors at this time. Prognosis is guarded and poor given multiple critical electrolyte imbalance. Hospice is appropriate. Discussed with family members. Discussed with nursing. Patient is critically ill. T.35                PATIENT NAME: Carleton Castleman  : 1940  MRN: [id number]  REASON FOR CONSULT: For evaluation and management of Acute Kidney Injury . (My recommendations will be communicated by way of shared medical record.)      RECOMMENDATIONS:   -Change NS to bicarb with D5  -With 150 M EQ's at 125 cc/h (no h/o HF, monitor for fluid overload)     -Na goal ~145 over 24 hrs  -Follow-up recheck labs  -Empirical antibiotics, follow-up culture   -- IV antibiotic: Vancomycin: trough goal <15, pharmacy team assisting. -ISS for BG control  -trend LA  -Insert Thomas catheter as with markedly enlarged prostate on CT scan  - check UA w/ microscopy, urine lytes,  - no need for dialysis,  at higher risk for decompensation, needing closer monitoring.     D/C plan from renal stand point:  -Expect prolonged recovery    Discussed with patient's team nurse    IMPRESSION:       Admitted on:  12/3/2022 10:09 PM   For:  Hospital-acquired pneumonia [J18.9, Y95]  Goals of care, counseling/discussion [Z71.89]  Severe sepsis ([facility] [A41.9, R65.20]  Acute renal failure, unspecified acute renal failure type [facility] [N17.9]  Pneumonia of right upper lobe due to infectious organism [J18.9]      CONSTANTIN (unknown baseline renal function. )  - BL Scr-unknown->  10.5 on admission  - Etiology of CONSTANTIN -presumed ATN in the setting of sepsis, severe hypovolemia, dehydration. - other differentials: If no major improvement, will GN / TI / TMA process  - UA - needs it   - Renal imaging: on admission with CAT scan:  Prostate is markedly enlarged. + no hydronephrosis. Large cyst on the left kidney. Associated problems:   - Volume status: hypovolemic  : HTN : no need for tight control   : Na: Hypernatremia, 152 on admission,  -Due to dehydration, increase free water deficit  -With acute encephalopathy, likely metabolic    - Azotemia: Severe, 186 on admission some component of uremic encephalopathy  - Electrolytes: K: Hyperkalemia, 5.4,  : Hypercalcemia, mild, likely due to hypovolemia, follow-up course, treatment initiate work-up if no improvement  - Acid-Base: Acidosis, severe, bicarb 11, high anion gap, metabolic versus lactic acidosis, history of  - Anemia: Elevated hemoglobin, likely due to hemoconcentration      Other major problems: Management per primary and other consulting teams. Hospital Problems             Last Modified POA    * (Principal) Hospital-acquired pneumonia 12/4/2022 Yes    Sepsis ([facility] 12/4/2022 Yes    ARF (acute renal failure) ([facility] 12/4/2022 Yes    Hypernatremia 12/4/2022 Yes    DM2 (diabetes mellitus, type 2) ([facility] 12/4/2022 Yes       : other supportive care :   - Check daily renal function panel with electrolytes-phosphorus  - Strict monitoring of I/Os, daily weight  - Renal feeds/diet  - Current medications reviewed.     - Nephrotoxic medications have been discontinued. - Dose adjusted and appropriate. - Dose meds for eGFR <15 mL/min/1.73m2 during CONSTANTIN    - Avoid heavy opioids due to renal failure - may use very low dose dilaudid / fentanyl with close monitoring of CNS and respiratory depression. Please refer to the orders. High Complexity. Multiple complex problems. Discussed with patient;s treatment team-  nurse  Time spent > 30 (~35) minutes. Thank you for allowing me to participate in this patient's care. Please do not hesitate to contact me anytime. We will follow along with you.        Thea Dover MD,  [facility] [address] [place] [address]  Office: [phone] or Via mobli  Fax: [phone]        =======================================================================================   =======================================================================================      CHIEF COMPLAINT:   Chief Complaint   Patient presents with    Failure To Thrive     Pt via EMS from [facility], started aricept 4 days ago for alzheimers, pt usually alert and talking, now pt having problems speaking, not eating or drinking, obvious swelling to right side abdomen, rash noted to both arms, hx of diabetes, bs 133 in squad     History Obtained From:  reason patient could not give history:  altered mental status + treatment team + Electronic Medical Records    HPI: Mr. Amanda You is a 81 y.o. male with significant past medical history as below:   Past Medical History:   Diagnosis Date    Cataract cortical, senile, bilateral     Cognitive communication deficit     Dementia ([facility]     Diabetes mellitus ([facility]     type II    Hyperlipidemia       Presents with Failure To Thrive (Pt via EMS from [facility], started aricept 4 days ago for alzheimers, pt usually alert and talking, now pt having problems speaking, not eating or drinking, obvious swelling to right side abdomen, rash noted to both arms, hx of diabetes, bs 133 in squad) Admitted with Hospital-acquired pneumonia [J18.9, Y95]  Goals of care, counseling/discussion [Z71.89]  Severe sepsis ([facility] [A41.9, R65.20]  Acute renal failure, unspecified acute renal failure type ([facility] [N17.9]  Pneumonia of right upper lobe due to infectious organism [J18.9]   Found to have severe acute kidney injury, with creatinine 10,  with hyponatremia, hypokalemia,, so we are called for that. Patient unable to fully participate, currently resting in bed, with encephalopathy, lethargy. Without any other acute distress. Regarding: CONSTANTIN ,   Duration: acute , unknown baseline  Location: kidneys  Severity: Severe and critical  Timing: continous  Context (ex: related to condition):  , refer to my assessment / impression. Modifying factors (ex: medications, interventions):  , refer to my plan / recommendation. Associated signs & symptoms (ex: edema, SOB): As mentioned above in CC and HPI    Chart reviewed, patient's pertinent electronic medical records , Medical history and medication reviewed as needed for my evaulation. Past medical, Surgical, Social, Family medical history reviewed by me. PAST MEDICAL HISTORY:   Past Medical History:   Diagnosis Date    Cataract cortical, senile, bilateral     Cognitive communication deficit     Dementia ([facility]     Diabetes mellitus ([facility]     type II    Hyperlipidemia      PAST SURGICAL HISTORY: No past surgical history reported  FAMILY HISTORY: Unable to obtain family history due to encephalopathy  SOCIAL HISTORY:   Social History     Socioeconomic History    Marital status:      Spouse name: None    Number of children: None    Years of education: None    Highest education level: None   Tobacco Use    Smoking status: Unknown          MEDICATIONS: reviewed by me. Medications Prior to Admission:  No current facility-administered medications on file prior to encounter.      Current Outpatient Medications on File Prior to Encounter   Medication Sig Dispense Refill    insulin glargine (LANTUS) 100 UNIT/ML injection vial Inject 46 Units into the skin nightly      tamsulosin (FLOMAX) 0.4 MG capsule Take 0.4 mg by mouth daily      metFORMIN (GLUCOPHAGE) 1000 MG tablet Take 1,000 mg by mouth 2 times daily (with meals)      rosuvastatin (CRESTOR) 40 MG tablet Take 40 mg by mouth every evening      insulin aspart (NOVOLOG) 100 UNIT/ML injection cartridge Inject into the skin 3 times daily (before meals) Sliding scale  BG 0-149= 0 units  -200= 2 units  -250= 4 units  -300= 6 units  -350= 8 units  -400= 10 units  Anything over 400, call MD for orders      acetaminophen (TYLENOL) 325 MG tablet Take 650 mg by mouth every 4 hours as needed for Pain           Current Facility-Administered Medications:     insulin glargine (LANTUS) injection vial 46 Units, 46 Units, SubCUTAneous, Nightly, Jesica Mack MD, 46 Units at 12/04/22 2156    tamsulosin (FLOMAX) capsule 0.4 mg, 0.4 mg, Oral, Daily, Jesica Mack MD, 0.4 mg at 12/04/22 0911    rosuvastatin (CRESTOR) tablet 40 mg, 40 mg, Oral, QPM, Jesica Mack MD    insulin lispro (HUMALOG) injection vial 1-10 Units, 1-10 Units, SubCUTAneous, 4x Daily AC & HS, Jesica Mack MD, 4 Units at 12/04/22 2156    sodium chloride flush 0.9 % injection 5-40 mL, 5-40 mL, IntraVENous, 2 times per day, Jesica Mack MD, 10 mL at 12/04/22 2158    sodium chloride flush 0.9 % injection 10 mL, 10 mL, IntraVENous, PRN, Jesica Mack MD, 10 mL at 12/05/22 1015    0.9 % sodium chloride infusion, , IntraVENous, PRN, Jesica Mack MD    ondansetron (ZOFRAN-ODT) disintegrating tablet 4 mg, 4 mg, Oral, Q8H PRN **OR** ondansetron (ZOFRAN) injection 4 mg, 4 mg, IntraVENous, Q6H PRN, Jesica Mack MD, 4 mg at 12/04/22 0327    magnesium hydroxide (MILK OF MAGNESIA) 400 MG/5ML suspension 30 mL, 30 mL, Oral, Daily PRN, Jesica Mack MD    acetaminophen (TYLENOL) tablet 650 mg, 650 mg, Oral, Q6H PRN **OR** acetaminophen (TYLENOL) suppository 650 mg, 650 mg, Rectal, Q6H PRN, Isra Gonzalez MD    cefepime (MAXIPIME) 1000 mg IVPB minibag, 1,000 mg, IntraVENous, Q24H, Isra Gonzalez MD, Stopped at 12/05/22 0920    vancomycin (VANCOCIN) intermittent dosing (placeholder), , Other, RX Placeholder, Isra Gonzalez MD    albuterol (PROVENTIL) nebulizer solution 2.5 mg, 2.5 mg, Nebulization, Q4H PRN, Isra Gonzalez MD    ipratropium-albuterol (DUONEB) nebulizer solution 1 ampule, 1 ampule, Inhalation, 4x daily, Isra Gonzalez MD, 1 ampule at 12/05/22 0940    heparin (porcine) injection 5,000 Units, 5,000 Units, SubCUTAneous, 3 times per day, Isra Gonzalez MD, 5,000 Units at 12/05/22 1015    glucose-vitamin C chewable tablet 4 tablet, 4 tablet, Oral, PRN, Isra Gonzalez MD    dextrose bolus 10% 125 mL, 125 mL, IntraVENous, PRN **OR** dextrose bolus 10% 250 mL, 250 mL, IntraVENous, PRN, Isra Gonzalez MD    glucagon (rDNA) injection 1 mg, 1 mg, SubCUTAneous, PRN, Isra Gonzalez MD    dextrose 10 % infusion, , IntraVENous, Continuous PRN, Isra Gonzalez MD    hydrALAZINE (APRESOLINE) injection 10 mg, 10 mg, IntraVENous, Q6H PRN, Isra Gonzalez MD    0.9 % sodium chloride bolus, 500 mL, IntraVENous, PRN, Isra Gonzalez MD    sodium bicarbonate 150 mEq in dextrose 5 % 1,150 mL infusion, , IntraVENous, Continuous, Janice Eason MD, Last Rate: 125 mL/hr at 12/05/22 0016, New Bag at 12/05/22 0016      Allergies reviewed by me: Aspirin    REVIEW OF SYSTEMS:  Unable to obtain family history due to encephalopathy       =======================================================================================     PHYSICAL EXAM:  Recent vital signs and recent I/Os reviewed by me.      Wt Readings from Last 3 Encounters:   12/04/22 163 lb 1.6 oz (74 kg)     BP Readings from Last 3 Encounters:   12/05/22 (!) 95/47     Patient Vitals for the past 24 hrs:   BP Temp Temp src Pulse Resp SpO2   12/05/22 0945 (!) 95/47 -- Axillary 67 30 96 %   12/05/22 0940 -- -- -- 71 20 97 %   12/05/22 0415 (!) 88/50 98.2 °F (36.8 °C) Axillary 76 21 97 %   12/05/22 0041 (!) 82/47 98.1 °F (36.7 °C) Axillary 68 20 94 %   12/04/22 2145 (!) 79/44 (!) 96.3 °F (35.7 °C) Axillary 69 22 99 %   12/04/22 2019 -- -- -- 68 18 97 %   12/04/22 1615 (!) 89/51 (!) 96.6 °F (35.9 °C) -- 80 30 94 %   12/04/22 1245 101/65 96.9 °F (36.1 °C) Axillary 95 30 96 %       No intake or output data in the 24 hours ending 12/05/22 1214        Physical Exam  Vitals reviewed. Constitutional:       General: He is not in acute distress. Appearance: Normal appearance. He is ill-appearing. HENT:      Head: Normocephalic and atraumatic. Right Ear: External ear normal.      Left Ear: External ear normal.      Nose: Nose normal.      Mouth/Throat:      Mouth: Mucous membranes are dry. Eyes:      General: No scleral icterus. Conjunctiva/sclera: Conjunctivae normal.   Neck:      Vascular: No JVD. Cardiovascular:      Rate and Rhythm: Normal rate and regular rhythm. Heart sounds: S1 normal and S2 normal.   Pulmonary:      Effort: Pulmonary effort is normal. No respiratory distress. Breath sounds: Rhonchi present. Abdominal:      General: Bowel sounds are normal. There is no distension. Musculoskeletal:         General: No swelling or deformity. Cervical back: Normal range of motion and neck supple. Skin:     General: Skin is dry. Coloration: Skin is not jaundiced. Neurological:      Mental Status: He is disoriented. Comments: Unable to obtain family history due to encephalopathy   Psychiatric:      Comments: Unable to obtain family history due to encephalopathy          =======================================================================================     DATA:  Diagnostic tests reviewed by me for today's visit:   (AS NEEDED FOR MY EVALUATION AND MANAGEMENT).        Recent Labs     12/03/22  2250 12/05/22  0737   WBC 22.3* 22.9* HCT 54.4* 47.5    170       Iron Saturation:  No components found for: PERCENTFE  FERRITIN:  No results found for: FERRITIN  IRON:  No results found for: IRON  TIBC:  No results found for: TIBC    Recent Labs     12/03/22  2250 12/04/22  1312 12/04/22  1509 12/04/22  1810 12/05/22  0830   * 148* 151* 151* 156*   K 5.4* 5.7* 6.4* 5.4* 5.7*    107 107 107 106   CO2 11* 4* 4* 7* 4*   * 198* 205* 202* 216*   CREATININE 10.5* 10.7* 10.9* 11.5* 11.6*       Recent Labs     12/03/22  2250 12/04/22  1312 12/04/22  1509 12/04/22  1810 12/05/22  0830   CALCIUM 10.8* 9.8 9.6 9.3 9.4   PHOS  --  11.6* 11.7* 10.5*  --        No results for input(s): PH, PCO2, PO2 in the last 72 hours.     Invalid input(s): Cuco Solid    ABG:  No results found for: PH, PCO2, PO2, HCO3, BE, THGB, TCO2, O2SAT  VBG:  No results found for: PHVEN, MJJ7PFU, BEVEN, I5CLXPEI    LDH:  No results found for: LDH  Uric Acid:    Lab Results   Component Value Date/Time    LABURIC 14.0 12/04/2022 01:12 PM       PT/INR:  No results found for: PROTIME, INR  Warfarin PT/INR:  No components found for: PTPATWAR, PTINRWAR  PTT:  No results found for: APTT, PTT[APTT}  Last 3 Troponin:    Lab Results   Component Value Date/Time    TROPONINI 0.14 12/03/2022 10:50 PM       U/A:  No results found for: NITRITE, COLORU, PROTEINU, PHUR, LABCAST, WBCUA, RBCUA, MUCUS, TRICHOMONAS, YEAST, BACTERIA, CLARITYU, SPECGRAV, LEUKOCYTESUR, UROBILINOGEN, BILIRUBINUR, BLOODU, GLUCOSEU, AMORPHOUS  Microalbumen/Creatinine ratio:  No components found for: RUCREAT  24 Hour Urine for Protein:  No components found for: RAWUPRO, UHRS3, PEJQ42AM, UTV3  24 Hour Urine for Creatinine Clearance:  No components found for: CREAT4, UHRS10, UTV10  Urine Toxicology:  No components found for: IAMMENTA, IBARBIT, IBENZO, ICOCAINE, IMARTHC, IOPIATES, IPHENCYC    HgBA1c:  No results found for: LABA1C  RPR:  No results found for: RPR  HIV:  No results found for: HIV  MANUEL:  No results found for: ANATITER, MANUEL  RF:  No results found for: RF  DSDNA:  No components found for: DNA  AMYLASE:  No results found for: AMYLASE  LIPASE:    Lab Results   Component Value Date/Time    LIPASE 18.0 12/03/2022 10:50 PM     Fibrinogen Level:  No components found for: FIB       BELOW MENTIONED RADIOLOGY STUDY RESULTS BY ME (AS NEEDED FOR MY EVALUATION AND MANAGEMENT). XR CHEST PORTABLE    Result Date: 12/3/2022  EXAMINATION: ONE XRAY VIEW OF THE CHEST 12/3/2022 10:56 pm COMPARISON: None. HISTORY: ORDERING SYSTEM PROVIDED HISTORY: SOB TECHNOLOGIST PROVIDED HISTORY: Reason for exam:->SOB Reason for Exam: SOB FINDINGS: Cardiac and mediastinal silhouettes appear within normal limits for size. Pulmonary vascularity is normal.  No pulmonary edema. Linear right perihilar airspace disease. No pneumothorax. No acute osseous abnormality. Focal right perihilar linear airspace disease which may represent atelectasis or pneumonia. Lungs are otherwise clear. CT CHEST ABDOMEN PELVIS WO CONTRAST Additional Contrast? None    Result Date: 12/4/2022  EXAMINATION: CT OF THE CHEST, ABDOMEN, AND PELVIS WITHOUT CONTRAST 12/3/2022 11:34 pm TECHNIQUE: CT of the chest, abdomen and pelvis was performed without the administration of intravenous contrast. Multiplanar reformatted images are provided for review. Automated exposure control, iterative reconstruction, and/or weight based adjustment of the mA/kV was utilized to reduce the radiation dose to as low as reasonably achievable.  COMPARISON: None HISTORY: ORDERING SYSTEM PROVIDED HISTORY: mass right side of abdomen TECHNOLOGIST PROVIDED HISTORY: Reason for exam:->mass right side of abdomen Additional Contrast?->None Decision Support Exception - unselect if not a suspected or confirmed emergency medical condition->Emergency Medical Condition (MA) Reason for Exam: Failure To Thrive (Pt via EMS [facility], started aricept 4 days ago for alzheimers, pt usually alert and talking, now pt having problems speaking, not eating or drinking, obvious swelling to right side abdomen, rash noted to both arms, hx of diabetes, bs 133 in squad) FINDINGS: CT CHEST: Mediastinum no enlarged lymphadenopathy. Aorta is unremarkable. Heart is normal. no effusion. Lungs/Pleura consolidation in the right upper lobe. This is along the major fissure. Mild respiratory motion. Atelectasis in the right lower lobe. Trachea and bronchi are patent. Bones spondylosis. No lytic destructive lesions. Right upper lobe pneumonia. Follow-up radiographs to resolution are recommended. CT ABDOMEN AND PELVIS Organs Liver, spleen, adrenals are unremarkable in appearance. No hydronephrosis. Large cyst on the left kidney. This has the Hounsfield units of 18 in it measures 4 cm transverse by 4.1 cm anterior to posterior. GI No small bowel dilation. Co colonic wall thickening. Stomach is unremarkable. No evidence for bowel obstruction. The cecum is a mildly thickened wall but this is not an annular constricting lesion. No small bowel dilation. Scattered colonic diverticula but no adjacent fatty stranding. Aorta No aneurysm. Atherosclerotic changes. Peritoneum/retroperitoneum No free fluid. No free gas. No enlarged lymphadenopathy. Other the prostate is enlarged and has multiple calcifications. It measures approximately 6.7 cm transverse by 4.8 cm anterior to posterior by 7.4 cm craniocaudal.  Multiple phleboliths within the pelvis. The bladder is not have a thickened wall. Bones large anterior spurs at multiple levels. Loss of lumbar lordosis. No lytic destructive lesions. Facet arthropathy at multiple levels. Mild osteoarthritic changes at the SI joints. IMPRESSION: 1. Mild thickening of the cecum but no definite mass. This is not well evaluated without contrast. 2. Prostate is markedly enlarged.            Imaging: [unfilled] ======================================================================  Please note that this chart entry has been generated using using voice recognition software, mainly. So please excuse brevity and/or typos. While every effort and attempts have been made to ensure the accuracy of this automated transcription, some errors may have occurred; and certain words and phrases in transcription may not be entered as intended. However, inadvertent computerized transcription errors may be present. So please contact us if any clarification needed.

## 2022-12-05 NOTE — PROGRESS NOTES
PT unable to swallow and eat yesterday laine to alertness and ability to follow commands. When attempting to give PT sips of water he would bite the cup. Attempting to keep mouth moist with swabs.

## 2022-12-05 NOTE — PROGRESS NOTES
Physical Therapy/ Occupational Therapy  Surendra Quiet   Chart reviewed and PT/OT eval attempted however upon entering room pt nurse is present and states pt has been very difficult to arouse at this time and his lactic acid levels are high as well as his respiratory rate. Attempted to arouse pt with loud voice and sternal rub however pt is unable to arouse to therapist attempts. Palliative care consult is in place. Will attempt to follow up with pt as schedule allows. Addendum-- d/c OT and PT at this time due to patient accepting into hospice services. Thank you,  Sola Dia. India Rolon, OTR/L [address]   [facility], [address] [facility], DPT [id number]   Sola Dia.  India Rolon, [address] [facility]

## 2022-12-05 NOTE — DISCHARGE SUMMARY
[facility] -- Physician Discharge Summary     Sharla Rainey  1940  MRN: [id number]    Admit Date: 12/3/2022  Discharge Date: No discharge date for patient encounter. Attending MD: Elvia Portillo MD  Discharging MD: Elvia Portillo MD  PCP: Lexi Paulson [address] [place] [address] [place] [address] [phone]    Admission Diagnosis: Hospital-acquired pneumonia [J18.9, Y95]  Goals of care, counseling/discussion [Z71.89]  Severe sepsis ([address] [A41.9, R65.20]  Acute renal failure, unspecified acute renal failure type ([address] [N17.9]  Pneumonia of right upper lobe due to infectious organism [J18.9]  DISCHARGE DIAGNOSIS: same    Full Hospital Problem List:  Active Hospital Problems    Diagnosis Date Noted    Hospital-acquired pneumonia [J18.9, Y95] 12/04/2022     Priority: Medium    Sepsis ([address] [A41.9] 12/04/2022     Priority: Medium    ARF (acute renal failure) ([address] [N17.9] 12/04/2022     Priority: Medium    Hypernatremia [E87.0] 12/04/2022     Priority: Medium    DM2 (diabetes mellitus, type 2) ([address] [E11.9] 12/04/2022     Priority: Medium           Hospital Course:  81 y.o. male who presents to the emergency department with complaint of not eating or drinking. The patient is from [facility], report states that this patient was started on Aricept 4 days ago for treatment of dementia but medication was discontinued yesterday, they report that the patient is generally alert and jovial, visiting with others however now he is \"having problems speaking\" also not eating or drinking.     Pt found to have pneumonia, suspected aspiration  Made npo, placed on abx  Also found to have renal failures, creat > 10  Renal consulted - not felt to be HD candidate    Family discusses case with palliative care RN  Decision made to transfer to [facility] made during Hospitalization:  IP CONSULT TO PHARMACY  IP CONSULT TO SOCIAL WORK  IP CONSULT TO FINANCIAL COUNSELOR  IP CONSULT TO NEPHROLOGY  PHARMACY TO DOSE MEDICATION  IP CONSULT TO PALLIATIVE CARE    Treatment team at time of Discharge: Treatment Team: Attending Provider: Clif Roque MD; Consulting Physician: Fabián Green MD; Registered Nurse: Serenity Knight, RN; Respiratory Therapist (Day): Yadiel Hussein RCP; Occupational Therapist: Ninoska Banks, OT; Consulting Physician: Christie Lott, RN; Speech Language Pathologist: Jorge Alberto Hauser, SLP; Physical Therapist: Faby Covington PT    Imaging Results:  XR CHEST PORTABLE    Result Date: 12/3/2022  EXAMINATION: ONE XRAY VIEW OF THE CHEST 12/3/2022 10:56 pm COMPARISON: None. HISTORY: ORDERING SYSTEM PROVIDED HISTORY: SOB TECHNOLOGIST PROVIDED HISTORY: Reason for exam:->SOB Reason for Exam: SOB FINDINGS: Cardiac and mediastinal silhouettes appear within normal limits for size. Pulmonary vascularity is normal.  No pulmonary edema. Linear right perihilar airspace disease. No pneumothorax. No acute osseous abnormality. Focal right perihilar linear airspace disease which may represent atelectasis or pneumonia. Lungs are otherwise clear. CT CHEST ABDOMEN PELVIS WO CONTRAST Additional Contrast? None    Result Date: 12/4/2022  EXAMINATION: CT OF THE CHEST, ABDOMEN, AND PELVIS WITHOUT CONTRAST 12/3/2022 11:34 pm TECHNIQUE: CT of the chest, abdomen and pelvis was performed without the administration of intravenous contrast. Multiplanar reformatted images are provided for review. Automated exposure control, iterative reconstruction, and/or weight based adjustment of the mA/kV was utilized to reduce the radiation dose to as low as reasonably achievable.  COMPARISON: None HISTORY: ORDERING SYSTEM PROVIDED HISTORY: mass right side of abdomen TECHNOLOGIST PROVIDED HISTORY: Reason for exam:->mass right side of abdomen Additional Contrast?->None Decision Support Exception - unselect if not a suspected or confirmed emergency medical condition->Emergency Medical Condition (MA) Reason for Exam: Failure To Thrive (Pt via EMS from [facility], started aricept 4 days ago for alzheimers, pt usually alert and talking, now pt having problems speaking, not eating or drinking, obvious swelling to right side abdomen, rash noted to both arms, hx of diabetes, bs 133 in squad) FINDINGS: CT CHEST: Mediastinum no enlarged lymphadenopathy. Aorta is unremarkable. Heart is normal. no effusion. Lungs/Pleura consolidation in the right upper lobe. This is along the major fissure. Mild respiratory motion. Atelectasis in the right lower lobe. Trachea and bronchi are patent. Bones spondylosis. No lytic destructive lesions. Right upper lobe pneumonia. Follow-up radiographs to resolution are recommended. CT ABDOMEN AND PELVIS Organs Liver, spleen, adrenals are unremarkable in appearance. No hydronephrosis. Large cyst on the left kidney. This has the Hounsfield units of 18 in it measures 4 cm transverse by 4.1 cm anterior to posterior. GI No small bowel dilation. Co colonic wall thickening. Stomach is unremarkable. No evidence for bowel obstruction. The cecum is a mildly thickened wall but this is not an annular constricting lesion. No small bowel dilation. Scattered colonic diverticula but no adjacent fatty stranding. Aorta No aneurysm. Atherosclerotic changes. Peritoneum/retroperitoneum No free fluid. No free gas. No enlarged lymphadenopathy. Other the prostate is enlarged and has multiple calcifications. It measures approximately 6.7 cm transverse by 4.8 cm anterior to posterior by 7.4 cm craniocaudal.  Multiple phleboliths within the pelvis. The bladder is not have a thickened wall. Bones large anterior spurs at multiple levels. Loss of lumbar lordosis. No lytic destructive lesions. Facet arthropathy at multiple levels. Mild osteoarthritic changes at the SI joints. IMPRESSION: 1. Mild thickening of the cecum but no definite mass.   This is not well evaluated without contrast. 2. Prostate is markedly enlarged. Discharge Exam:  See progress note from day of d/c    Disposition: inpt hospice    Condition: stable    Discharge Medications:     Medication List        STOP taking these medications      acetaminophen 325 MG tablet  Commonly known as: TYLENOL     insulin aspart 100 UNIT/ML injection cartridge  Commonly known as: NovoLOG     insulin glargine 100 UNIT/ML injection vial  Commonly known as: LANTUS     metFORMIN 1000 MG tablet  Commonly known as: GLUCOPHAGE     rosuvastatin 40 MG tablet  Commonly known as: CRESTOR     tamsulosin 0.4 MG capsule  Commonly known as: FLOMAX                 Allergies: Allergies   Allergen Reactions    Aspirin        Follow up Instructions: Follow-up with PCP: Rdaha Parrish in 2 wk .       Total time spent on day of discharge including face-to-face visit, examination, documentation, counseling, preparation of discharge plans and followup, and discharge medicine reconciliation and presciptions is 33 minutes    Signed:  Eleanor Benitez MD  12/5/2022

## 2022-12-05 NOTE — PLAN OF CARE
Problem: Pain  Goal: Verbalizes/displays adequate comfort level or baseline comfort level  Outcome: Progressing     Problem: Skin/Tissue Integrity  Goal: Absence of new skin breakdown  Description: 1. Monitor for areas of redness and/or skin breakdown  2. Assess vascular access sites hourly  3. Every 4-6 hours minimum:  Change oxygen saturation probe site  4. Every 4-6 hours:  If on nasal continuous positive airway pressure, respiratory therapy assess nares and determine need for appliance change or resting period. Outcome: Progressing     Problem: Safety - Adult  Goal: Free from fall injury  Outcome: Progressing  Flowsheets (Taken 12/5/2022 0356)  Free From Fall Injury: Instruct family/caregiver on patient safety     Problem: Confusion  Goal: Confusion, delirium, dementia, or psychosis is improved or at baseline  Description: INTERVENTIONS:  1. Assess for possible contributors to thought disturbance, including medications, impaired vision or hearing, underlying metabolic abnormalities, dehydration, psychiatric diagnoses, and notify attending LIP  2. Rowan high risk fall precautions, as indicated  3. Provide frequent short contacts to provide reality reorientation, refocusing and direction  4. Decrease environmental stimuli, including noise as appropriate  5. Monitor and intervene to maintain adequate nutrition, hydration, elimination, sleep and activity  6. If unable to ensure safety without constant attention obtain sitter and review sitter guidelines with assigned personnel  7.  Initiate Psychosocial CNS and Spiritual Care consult, as indicated  Outcome: Progressing

## 2022-12-05 NOTE — PROGRESS NOTES
Progress Note - Dr. Omero Davis - Internal Medicine  PCP: Darryn Campuzano [address] [facility] [address] [address] [place] [address] Day: 1  Code Status: DNR-CC  Current Diet: ADULT DIET; Regular; 4 carb choices (60 gm/meal)        CC: follow up on medical issues    Subjective:   Juanis Angel is a 81 y.o. male. Pt seen and examined  Chart reviewed since last visit, labs and imaging below      No complaints  Does not answer questions, cannot get hx nor ROS from pt    Creat this am pending  Was >11 yest  Renal consulted; they did not feel dialysis was indicated at that time    Pt currently NPO until speech eval can be done    Review of Systems: (1 system for EPF, 2-9 for detailed, 10+ for comprehensive)  Pt not really answering questions. Cannot get ROS    I have reviewed the patient's medical and social history in detail and updated the computerized patient record. To recap: He  has a past medical history of Cataract cortical, senile, bilateral, Cognitive communication deficit, Dementia ([facility] Diabetes mellitus ([address] and Hyperlipidemia. . He  has no past surgical history on file. Joshua Drake  .         Active Hospital Problems    Diagnosis Date Noted    Hospital-acquired pneumonia [J18.9, Y95] 12/04/2022     Priority: Medium    Sepsis ([address] [A41.9] 12/04/2022     Priority: Medium    ARF (acute renal failure) ([address] [N17.9] 12/04/2022     Priority: Medium    Hypernatremia [E87.0] 12/04/2022     Priority: Medium    DM2 (diabetes mellitus, type 2) ([address] [E11.9] 12/04/2022     Priority: Medium       Current Facility-Administered Medications: insulin glargine (LANTUS) injection vial 46 Units, 46 Units, SubCUTAneous, Nightly  tamsulosin (FLOMAX) capsule 0.4 mg, 0.4 mg, Oral, Daily  rosuvastatin (CRESTOR) tablet 40 mg, 40 mg, Oral, QPM  insulin lispro (HUMALOG) injection vial 1-10 Units, 1-10 Units, SubCUTAneous, 4x Daily AC & HS  sodium chloride flush 0.9 % injection 5-40 mL, 5-40 mL, IntraVENous, 2 times per day  sodium chloride flush 0.9 % injection 10 mL, 10 mL, IntraVENous, PRN  0.9 % sodium chloride infusion, , IntraVENous, PRN  ondansetron (ZOFRAN-ODT) disintegrating tablet 4 mg, 4 mg, Oral, Q8H PRN **OR** ondansetron (ZOFRAN) injection 4 mg, 4 mg, IntraVENous, Q6H PRN  magnesium hydroxide (MILK OF MAGNESIA) 400 MG/5ML suspension 30 mL, 30 mL, Oral, Daily PRN  acetaminophen (TYLENOL) tablet 650 mg, 650 mg, Oral, Q6H PRN **OR** acetaminophen (TYLENOL) suppository 650 mg, 650 mg, Rectal, Q6H PRN  cefepime (MAXIPIME) 1000 mg IVPB minibag, 1,000 mg, IntraVENous, Q24H  vancomycin (VANCOCIN) intermittent dosing (placeholder), , Other, RX Placeholder  albuterol (PROVENTIL) nebulizer solution 2.5 mg, 2.5 mg, Nebulization, Q4H PRN  ipratropium-albuterol (DUONEB) nebulizer solution 1 ampule, 1 ampule, Inhalation, 4x daily  heparin (porcine) injection 5,000 Units, 5,000 Units, SubCUTAneous, 3 times per day  glucose-vitamin C chewable tablet 4 tablet, 4 tablet, Oral, PRN  dextrose bolus 10% 125 mL, 125 mL, IntraVENous, PRN **OR** dextrose bolus 10% 250 mL, 250 mL, IntraVENous, PRN  glucagon (rDNA) injection 1 mg, 1 mg, SubCUTAneous, PRN  dextrose 10 % infusion, , IntraVENous, Continuous PRN  hydrALAZINE (APRESOLINE) injection 10 mg, 10 mg, IntraVENous, Q6H PRN  0.9 % sodium chloride bolus, 500 mL, IntraVENous, PRN  sodium bicarbonate 150 mEq in dextrose 5 % 1,150 mL infusion, , IntraVENous, Continuous         Objective:  BP (!) 88/50   Pulse 76   Temp 98.2 °F (36.8 °C) (Axillary)   Resp 21   Ht 5' 10\" (1.778 m) Comment: estimated  Wt 163 lb 1.6 oz (74 kg)   SpO2 97%   BMI 23.40 kg/m²      Patient Vitals for the past 24 hrs:   BP Temp Temp src Pulse Resp SpO2   12/05/22 0415 (!) 88/50 98.2 °F (36.8 °C) Axillary 76 21 97 %   12/05/22 0041 (!) 82/47 98.1 °F (36.7 °C) Axillary 68 20 94 %   12/04/22 2145 (!) 79/44 (!) 96.3 °F (35.7 °C) Axillary 69 22 99 %   12/04/22 2019 -- -- -- 68 18 97 %   12/04/22 1615 (!) 89/51 (!) 96.6 °F (35.9 °C) -- 80 30 94 %   12/04/22 1245 101/65 96.9 °F (36.1 °C) Axillary 95 30 96 %   12/04/22 0900 (!) 104/57 96.8 °F (36 °C) Axillary 94 30 95 %       Patient Vitals for the past 96 hrs (Last 3 readings):   Weight   12/04/22 0429 163 lb 1.6 oz (74 kg)   12/03/22 2352 180 lb 4.8 oz (81.8 kg)             Intake/Output Summary (Last 24 hours) at 12/5/2022 2255  Last data filed at 12/4/2022 1154  Gross per 24 hour   Intake --   Output 75 ml   Net -75 ml           Physical Exam: (2-7 system for EPF/Detailed, ?8 for Comprehensive)  BP (!) 88/50   Pulse 76   Temp 98.2 °F (36.8 °C) (Axillary)   Resp 21   Ht 5' 10\" (1.778 m) Comment: estimated  Wt 163 lb 1.6 oz (74 kg)   SpO2 97%   BMI 23.40 kg/m²   Constitutional: vitals as above: alert, appears stated age and cooperative    Head: Normocephalic, without obvious abnormality, atraumatic    Eyes:lids and lashes normal, conjunctivae and sclerae normal and pupils equal, round, reactive to light and accomodation    EMNT: external ears normal, nares midline    Neck: no carotid bruit, supple, symmetrical, trachea midline and thyroid not enlarged, symmetric, no tenderness/mass/nodules     Respiratory: crackles bilaterally with normal respiratory effort    Cardiovascular: normal rate, regular rhythm, normal S1 and S2 and no murmurs    Gastrointestinal: soft, non-tender, non-distended, normal bowel sounds, no masses or organomegaly    Extremities: no clubbing, no edema    Skin:No rashes or nodules noted.     Neurologic:negative         Labs:  Lab Results   Component Value Date    WBC 22.9 (H) 12/05/2022    HGB 15.1 12/05/2022    HCT 47.5 12/05/2022     12/05/2022    ALT 18 12/03/2022    AST 15 12/03/2022     (H) 12/04/2022    K 5.4 (H) 12/04/2022     12/04/2022    CREATININE 11.5 (HH) 12/04/2022     (HH) 12/04/2022    CO2 7 (LL) 12/04/2022     Lab Results   Component Value Date    TROPONINI 0.14 (H) 12/03/2022       Recent Imaging Results are Reviewed:  XR CHEST PORTABLE    Result Date: 12/3/2022  EXAMINATION: ONE XRAY VIEW OF THE CHEST 12/3/2022 10:56 pm COMPARISON: None. HISTORY: ORDERING SYSTEM PROVIDED HISTORY: SOB TECHNOLOGIST PROVIDED HISTORY: Reason for exam:->SOB Reason for Exam: SOB FINDINGS: Cardiac and mediastinal silhouettes appear within normal limits for size. Pulmonary vascularity is normal.  No pulmonary edema. Linear right perihilar airspace disease. No pneumothorax. No acute osseous abnormality. Focal right perihilar linear airspace disease which may represent atelectasis or pneumonia. Lungs are otherwise clear. CT CHEST ABDOMEN PELVIS WO CONTRAST Additional Contrast? None    Result Date: 12/4/2022  EXAMINATION: CT OF THE CHEST, ABDOMEN, AND PELVIS WITHOUT CONTRAST 12/3/2022 11:34 pm TECHNIQUE: CT of the chest, abdomen and pelvis was performed without the administration of intravenous contrast. Multiplanar reformatted images are provided for review. Automated exposure control, iterative reconstruction, and/or weight based adjustment of the mA/kV was utilized to reduce the radiation dose to as low as reasonably achievable. COMPARISON: None HISTORY: ORDERING SYSTEM PROVIDED HISTORY: mass right side of abdomen TECHNOLOGIST PROVIDED HISTORY: Reason for exam:->mass right side of abdomen Additional Contrast?->None Decision Support Exception - unselect if not a suspected or confirmed emergency medical condition->Emergency Medical Condition (MA) Reason for Exam: Failure To Thrive (Pt via EMS from [facility], started aricept 4 days ago for alzheimers, pt usually alert and talking, now pt having problems speaking, not eating or drinking, obvious swelling to right side abdomen, rash noted to both arms, hx of diabetes, bs 133 in squad) FINDINGS: CT CHEST: Mediastinum no enlarged lymphadenopathy. Aorta is unremarkable. Heart is normal. no effusion. Lungs/Pleura consolidation in the right upper lobe. This is along the major fissure.   Mild respiratory motion. Atelectasis in the right lower lobe. Trachea and bronchi are patent. Bones spondylosis. No lytic destructive lesions. Right upper lobe pneumonia. Follow-up radiographs to resolution are recommended. CT ABDOMEN AND PELVIS Organs Liver, spleen, adrenals are unremarkable in appearance. No hydronephrosis. Large cyst on the left kidney. This has the Hounsfield units of 18 in it measures 4 cm transverse by 4.1 cm anterior to posterior. GI No small bowel dilation. Co colonic wall thickening. Stomach is unremarkable. No evidence for bowel obstruction. The cecum is a mildly thickened wall but this is not an annular constricting lesion. No small bowel dilation. Scattered colonic diverticula but no adjacent fatty stranding. Aorta No aneurysm. Atherosclerotic changes. Peritoneum/retroperitoneum No free fluid. No free gas. No enlarged lymphadenopathy. Other the prostate is enlarged and has multiple calcifications. It measures approximately 6.7 cm transverse by 4.8 cm anterior to posterior by 7.4 cm craniocaudal.  Multiple phleboliths within the pelvis. The bladder is not have a thickened wall. Bones large anterior spurs at multiple levels. Loss of lumbar lordosis. No lytic destructive lesions. Facet arthropathy at multiple levels. Mild osteoarthritic changes at the SI joints. IMPRESSION: 1. Mild thickening of the cecum but no definite mass. This is not well evaluated without contrast. 2. Prostate is markedly enlarged. Lab Results   Component Value Date/Time    GLUCOSE 215 12/04/2022 06:10 PM     Lab Results   Component Value Date/Time    POCGLU 88 12/05/2022 07:47 AM     BP (!) 88/50   Pulse 76   Temp 98.2 °F (36.8 °C) (Axillary)   Resp 21   Ht 5' 10\" (1.778 m) Comment: estimated  Wt 163 lb 1.6 oz (74 kg)   SpO2 97%   BMI 23.40 kg/m²     Assessment and Plan:  Principal Problem:    Hospital-acquired pneumonia -Established problem. Stable.   Wbc still 23k  Plan: cont abx (vanc/cefepime). Cont to trend wbc procal  Active Problems:    Sepsis ([facility] -Established problem. Stable. Plan: trend lactate    ARF (acute renal failure) ([facility] -Established problem. Stable. Creat 10.5 yest, labs still pending this am  Plan: renal consulted. may need HD is this does not improve    Hypernatremia -Established problem. Stable. Na 152 yest, pending this am  Plan: renal eval in progress    DM2 (diabetes mellitus, type 2) ([facility] -Established problem. Stable.   Glu 215  Plan: cont ccc diet, sliding scale, home meds      (Please note that portions of this note were completed with a voice recognition program.  Efforts were made to edit the dictations but occasionally words are mis-transcribed.)        Daphne Hampton MD  12/5/2022

## 2022-12-05 NOTE — CARE COORDINATION
Discharge planning:       met with 2 of patient's daughters, Prerna Stapleton and Nate Bishop. Discussion regarding palliative/hospice services with daughters. Daughter's agree on hospice and choices were given for hospice companies. Daughter Nate Bishop [facility]) states that she has past experience with [facility] and requests referral be placed with [facility] and would like patient to go to [place]. CM faxed/called referral to [facility]. Also placed call to [facility] hospital liaison for [facility] to make her aware of referral.        UPDATE:     Family has agreed to sign with [facility]. Patient will be transferred to [facility] inpatient unit today at 4:30 pm by [address] [facility].     Hospice of [address] [place] [address], [address] [place] [address]  [phone]      Electronically signed by Brian Reeves RN on 12/5/2022 at 12:33 PM

## 2022-12-05 NOTE — CARE COORDINATION
Patient is from [place] [address], he lives on the dementia unit per Ashely Sellers at the facility. May return upon discharge, no precert needed.        Electronically signed by Shiv Graham RN on 12/5/2022 at 9:07 AM

## 2022-12-08 LAB
BLOOD CULTURE, ROUTINE: NORMAL
CULTURE, BLOOD 2: NORMAL